# Patient Record
Sex: MALE | Employment: FULL TIME | URBAN - METROPOLITAN AREA
[De-identification: names, ages, dates, MRNs, and addresses within clinical notes are randomized per-mention and may not be internally consistent; named-entity substitution may affect disease eponyms.]

---

## 2017-01-20 ENCOUNTER — HOSPITAL ENCOUNTER (OUTPATIENT)
Dept: PHYSICAL THERAPY | Age: 32
Discharge: HOME | End: 2017-01-20
Payer: COMMERCIAL

## 2017-01-20 PROCEDURE — 97530 THERAPEUTIC ACTIVITIES: CPT | Performed by: PHYSICAL THERAPIST

## 2017-01-20 PROCEDURE — 97016 VASOPNEUMATIC DEVICE THERAPY: CPT | Performed by: PHYSICAL THERAPIST

## 2017-01-20 PROCEDURE — 97110 THERAPEUTIC EXERCISES: CPT | Performed by: PHYSICAL THERAPIST

## 2017-01-20 NOTE — PROGRESS NOTES
PT DAILY TREATMENT NOTE 2-15    Patient Name: Mickie Farah  Date:2017  : 1985  [x]  Patient  Verified  Payor: BLUE CROSS / Plan: [address] [place] [address] / Product Type: PPO /    In time: 1010a  Out time:   Total Treatment Time (min): 75  Visit #: 14     Treatment Area: Knee pain [M25.569]    SUBJECTIVE  Pain Level (0-10 scale): 0/10  Any medication changes, allergies to medications, adverse drug reactions, diagnosis change, or new procedure performed?: [x] No    [] Yes (see summary sheet for update)  Subjective functional status/changes:   [] No changes reported  Pt reports his knee is doing pretty well today.      OBJECTIVE    Modality rationale: decrease inflammation and decrease pain to improve the patients ability to restore activity tolerance   Min Type Additional Details    [] Estim: []Att   []Unatt        []TENS instruct                  []IFC  []Premod   []NMES                     []Other:  []w/US   []w/ice   []w/heat  Position:  Location:    []  Traction: [] Cervical       []Lumbar                       [] Prone          []Supine                       []Intermittent   []Continuous Lbs:  [] before manual  [] after manual  []w/heat    []  Ultrasound: []Continuous   [] Pulsed at:                            []1MHz   []3MHz Location:  W/cm2:    []  Paraffin         Location:  []w/heat    []  Ice     []  Heat  []  Ice massage Position:  Location:    []  Laser  []  Other: Position:  Location:   15 [x]  Vasopneumatic Device Pressure:       [] lo [x] med [] hi   Temperature: 34deg    [x] Skin assessment post-treatment:  [x]intact []redness- no adverse reaction    []redness  adverse reaction:     45 min Therapeutic Exercise:  [x] See flow sheet :   Rationale: increase strength, improve coordination and improve balance to improve the patients ability to perform jiu jitsu without pain    15 min Therapeutic Activity:  [x]  See flow sheet : single limb hop test training   Rationale: increase strength, improve coordination and increase proprioception  to improve the patients ability to jump, cut without pain    With   [] TE   [] TA   [] neuro   [] other: Patient Education: [x] Review HEP    [] Progressed/Changed HEP based on:   [] positioning   [] body mechanics   [] transfers   [] heat/ice application    [] other:      Other Objective/Functional Measures: Single limb hop testing:       Single: R: 5'5\" L: 7'2\" (76%)       Triple: R: 17'2\" L: 18'  (95%)      Triple Crossover: R: 16'8\" L: 18'  (93%)      6m Timed hop: R: 2.28\" L: 2.02\" (89%)    Pain Level (0-10 scale) post treatment: 0/10    ASSESSMENT/Changes in Function:   Nearly met single limb hop testing goals. Will plan on d/c at next session in 1-2 weeks pending ability to pass single limb hop test.   Patient will continue to benefit from skilled PT services to modify and progress therapeutic interventions, address functional mobility deficits, address ROM deficits, address strength deficits, analyze and address soft tissue restrictions and analyze and cue movement patterns to attain remaining goals. []  See Plan of Care  []  See progress note/recertification  []  See Discharge Summary         Progress towards goals / Updated goals:  Short Term Goals: To be accomplished in 2 weeks:  Pt will be able to perform straight leg raise without extensor lag to work to remove brace MET  Improved knee flexion ROM to 90deg without increase in symptoms MET  Maintain knee extension ROM of at least 0deg extension MET      Long Term Goals:  To be accomplished in 12 weeks:  Pt will be able to perform 10 single limb squats without increase in symptoms demonstrating good functional form MET  Pt will begin to return to light running without increase in symptoms MET  Pt will demonstrate at least 85% functional LE strength with single limb hop testing compared bilaterally PROGRESSING  Pt will be able to begin returning to kickboxing and jiu jitsu training without increase in symptoms    PLAN  [x]  Upgrade activities as tolerated     [x]  Continue plan of care  []  Update interventions per flow sheet       []  Discharge due to:_  []  Other:_      Darrin Nageotte, PT 1/20/2017  10:50 AM

## 2017-02-03 ENCOUNTER — HOSPITAL ENCOUNTER (OUTPATIENT)
Dept: PHYSICAL THERAPY | Age: 32
Discharge: HOME | End: 2017-02-03
Payer: COMMERCIAL

## 2017-02-03 PROCEDURE — 97110 THERAPEUTIC EXERCISES: CPT | Performed by: PHYSICAL THERAPIST

## 2017-02-03 PROCEDURE — 97530 THERAPEUTIC ACTIVITIES: CPT | Performed by: PHYSICAL THERAPIST

## 2017-02-03 NOTE — PROGRESS NOTES
PT DAILY TREATMENT NOTE 2-15    Patient Name: Carleen Avendano  Date:2/3/2017  : 1985  [x]  Patient  Verified  Payor: BLUE CROSS / Plan: [address] [place] [address] / Product Type: PPO /    In time: 56a  Out time: 940a  Total Treatment Time (min): 50  Visit #: 15     Treatment Area: Knee pain [M25.569]    SUBJECTIVE  Pain Level (0-10 scale): 0/10  Any medication changes, allergies to medications, adverse drug reactions, diagnosis change, or new procedure performed?: [x] No    [] Yes (see summary sheet for update)  Subjective functional status/changes:   [] No changes reported  Pt reports he is doing well today. OBJECTIVE    20 min Therapeutic Exercise:  [x] See flow sheet :   Rationale: increase strength and improve coordination to improve the patients ability to restore mobility and function    30 min Therapeutic Activity:  [x]  See flow sheet : single limb hop testing   Rationale: increase strength and improve coordination  to improve the patients ability to restore mobility and function          With   [x] TE   [] TA   [] neuro   [] other: Patient Education: [x] Review HEP    [] Progressed/Changed HEP based on:   [] positioning   [] body mechanics   [] transfers   [] heat/ice application    [] other:      Other Objective/Functional Measures:  FOTO: 84/100    Pain Level (0-10 scale) post treatment: 0/10    ASSESSMENT/Changes in Function:   []  See Plan of Care  []  See progress note/recertification  [x]  See Discharge Summary         Progress towards goals / Updated goals:  See d/c note    PLAN  []  Upgrade activities as tolerated     []  Continue plan of care  []  Update interventions per flow sheet       [x]  Discharge due to: ability to self-manage  []  Other:_      Alex Corrales, PT 2/3/2017  8:54 AM

## 2017-02-03 NOTE — ANCILLARY DISCHARGE INSTRUCTIONS
[facility]  [address] [address], [address]  Phone: [phone]  Fax: [phone]    Discharge Summary  2-15    Patient name: Laura Javier  : 1985  Provider#: [id number]  Referral source: Dhiraj Moser MD      Medical/Treatment Diagnosis: Knee pain [M25.569]     Prior Hospitalization: see medical history     Comorbidities: chronic knee pain, probably patellar dislocations on R  Prior Level of Function: able to kick box, participate in jiu jitsu without pain  Medications: Verified on Patient Summary List    Start of Care: 16      Onset Date: 16   Visits from Start of Care: 15     Missed Visits: 1  Reporting Period : 16 to 2/3/17    ASSESSMENT/SUMMARY OF CARE: Mr. Christal Salazar has been seen for 15 sessions s/p ACL repair. He has made good progress to this point and is now demonstrating at least 85% on functional single limb hop testing. He should be able to continue to manage care independently at this time. No further PT required. Single limb hop testing:  Single: R: 6'2\" L: 7'2\" (86%)  Triple: R: 17'11\" L: 20'3\" (88%)  Triple Crossover: R: 16'8\" L: 18'4\" (91%)  6m Timed hop: R: 2.28\" L: 2.14\" (94%)    Short Term Goals: To be accomplished in 2 weeks:  Pt will be able to perform straight leg raise without extensor lag to work to remove brace MET  Improved knee flexion ROM to 90deg without increase in symptoms MET  Maintain knee extension ROM of at least 0deg extension MET      Long Term Goals:  To be accomplished in 12 weeks:  Pt will be able to perform 10 single limb squats without increase in symptoms demonstrating good functional form MET  Pt will begin to return to light running without increase in symptoms MET  Pt will demonstrate at least 85% functional LE strength with single limb hop testing compared bilaterally MET  Pt will be able to begin returning to kickboxing and jiu jitsu training without increase in symptoms NOT YET ATTEMPTED WITHOUT BRACE    RECOMMENDATIONS:  [x]Discontinue therapy: [x]Patient has reached or is progressing toward set goals      []Patient is non-compliant or has abdicated      []Due to lack of appreciable progress towards set [address] Adrian Copeland, PT 2/3/2017 8:55 AM

## 2017-04-13 ENCOUNTER — OFFICE VISIT (OUTPATIENT)
Dept: INTERNAL MEDICINE CLINIC | Age: 32
End: 2017-04-13

## 2017-04-13 VITALS
WEIGHT: 206 LBS | RESPIRATION RATE: 16 BRPM | DIASTOLIC BLOOD PRESSURE: 88 MMHG | SYSTOLIC BLOOD PRESSURE: 132 MMHG | BODY MASS INDEX: 27.3 KG/M2 | HEART RATE: 78 BPM | TEMPERATURE: 98.2 F | HEIGHT: 73 IN

## 2017-04-13 DIAGNOSIS — A63.0 GENITAL WARTS: Primary | ICD-10-CM

## 2017-04-13 RX ORDER — IMIQUIMOD 12.5 MG/.25G
CREAM TOPICAL
Qty: 24 PACKET | Refills: 1 | Status: SHIPPED | OUTPATIENT
Start: 2017-04-13

## 2017-04-13 NOTE — PATIENT INSTRUCTIONS
Genital Warts: Care Instructions  Your Care Instructions  Genital warts are caused by a virus called the human papillomavirus (HPV). They are considered a sexually transmitted infection (STI) because the virus can be spread by sexual contact. The warts often look like small, fleshy bumps or flat, white patches. They can be anywhere in the genital area. You can also be infected with HPV yet not have visible warts. Genital warts often go away on their own without treatment. Some people decide to treat them because of the symptoms or the warts' appearance. Follow-up care is a key part of your treatment and safety. Be sure to make and go to all appointments, and call your doctor if you are having problems. It's also a good idea to know your test results and keep a list of the medicines you take. How can you care for yourself at home? · If your doctor gave you medicine to treat your warts at home, use the medicine exactly as prescribed. Call your doctor if you think you are having a problem with your medicine. · To reduce the itching and irritation from genital warts:  ¨ Take warm baths or wash the area with warm water 3 or 4 times a day. ¨ Keep the warts clean and dry in between baths. You may want to let the sores air dry. This may feel better than a towel. ¨ Do not use over-the-counter wart removal products to treat genital warts. These products are not intended for the genital area and may cause serious burns. To prevent the spread of genital warts  · Be sure to use a latex condom every time you have sex. You can infect someone even if you do not have an obvious wart. · Having one sex partner (who does not have STIs and does not have sex with anyone else) is a good way to avoid STIs. · Wash your hands if you touch the warts. · Talk to your sex partner or partners about genital warts. When should you call for help?   Call your doctor now or seek immediate medical care if:  · A genital wart hurts or spreads. Watch closely for changes in your health, and be sure to contact your doctor if:  · You want further treatment for your genital warts. · You do not get better as expected. Where can you learn more? Go to http://florentino-ubaldo.info/. Enter U930 in the search box to learn more about \"Genital Warts: Care Instructions. \"  Current as of: July 28, 2016  Content Version: 11.2  © 9286-5236 [employer]. Care instructions adapted under license by [employer] (which disclaims liability or warranty for this information). If you have questions about a medical condition or this instruction, always ask your healthcare professional. [place] [address] any warranty or liability for your use of this information.

## 2017-04-13 NOTE — PROGRESS NOTES
Subjective:    Theodore Kaminski is a 28 y.o. male who presents for lesion removal. We have discussed this procedure, including option of not performing surgery, technique of surgery and potential for scarring at an earlier visit. Oubjective:   Patient appears well. Visit Vitals    /88    Pulse 78    Temp 98.2 °F (36.8 °C)    Resp 16    Ht 6' 1\" (1.854 m)    Wt 206 lb (93.4 kg)    BMI 27.18 kg/m2     Skin: penis - multiple condyloma accuminata  Right hand - multiple warts on hand  Assessment:   warts    Procedure Note:   After informed consent was obtained, cryotherapy with liquid nitrogen was performed. Antibiotic dressing is applied, and wound care instructions provided. Be alert for any signs of cutaneous infection. The procedure was well tolerated without complications. Follow up: the patient may return prn. Additional lesion site: right hand wart   cryotherapy with liquid nitrogen was performed. Antibiotic dressing is applied, and wound care instructions provided. The procedure was well tolerated without complications. Follow up: prn.

## 2017-04-13 NOTE — PROGRESS NOTES
Chief Complaint   Patient presents with    Abrasion     penis     he is a 28y.o. year old male who presents for evaluation of an abrasion on his penis. Was told it was not HPV but it has been there for about 2 years    Reviewed and agree with Nurse Note and duplicated in this note. Reviewed PmHx, RxHx, FmHx, SocHx, AllgHx and updated and dated in the chart. Family History   Problem Relation Age of Onset    No Known Problems Mother     No Known Problems Father      No past medical history on file. Social History     Social History    Marital status: SINGLE     Spouse name: N/A    Number of children: N/A    Years of education: N/A     Social History Main Topics    Smoking status: Never Smoker    Smokeless tobacco: Not on file    Alcohol use Yes      Comment: socially    Drug use: No    Sexual activity: Not on file     Other Topics Concern    Not on file     Social History Narrative        Review of Systems - negative except as listed above      Objective:     Vitals:    04/13/17 1407   BP: 132/88   Pulse: 78   Resp: 16   Temp: 98.2 °F (36.8 °C)   Weight: 206 lb (93.4 kg)   Height: 6' 1\" (1.854 m)       Physical Examination: General appearance - alert, well appearing, and in no distress  Back exam - full range of motion, no tenderness, palpable spasm or pain on motion  Neurological - alert, oriented, normal speech, no focal findings or movement disorder noted  Musculoskeletal - no joint tenderness, deformity or swelling  Extremities - peripheral pulses normal, no pedal edema, no clubbing or cyanosis  Skin - penis multiple genital warts    Assessment/ Plan:   Dsutin Dubois was seen today for abrasion. Diagnoses and all orders for this visit:    Genital warts  -     [id number] - DESTRUC BENIGN LESION, UP TO 14 LESIONS    Other orders  -     imiquimod (ALDARA) 5 % cream; Apply a thin layer 3 times/week prior to bedtime and leave on skin for 6-10 hours. Remove with mild soap and water.  Continue treatment until there is total clearance of the warts or a maximum duration of therapy of 16 weeks     Follow-up Disposition: Not on File    I have discussed the diagnosis with the patient and the intended plan as seen in the above orders. The patient has received an after-visit summary and questions were answered concerning future plans. Medication Side Effects and Warnings were discussed with patient: yes  Patient Labs were reviewed and or requested: yes  Patient Past Records were reviewed and or requested  yes  I have discussed the diagnosis with the patient and the intended plan as seen in the above orders. The patient has received an after-visit summary and questions were answered concerning future plans. Pt agrees to call or return to clinic and/or go to closest ER with any worsening of symptoms. This may include, but not limited to increased fever (>100.4) with NSAIDS or Tylenol, increased edema, confusion, rash, worsening of presenting symptoms. Patient was informed/counseled to:      1) Remember to stay active and/or exercise regularly (I suggest 30-45 minutes daily)   2) For reliable dietary information, go to www. EATRIGHT.org. You may wish to consider seeing the nutritionist at [facility] at #[phone] or [phone], also consider the [address]   3) I routinely suggest a complete physical exam once each year (your birth month)

## 2017-05-09 ENCOUNTER — HOSPITAL ENCOUNTER (OUTPATIENT)
Dept: GENERAL RADIOLOGY | Age: 32
Discharge: HOME | End: 2017-05-09
Attending: ORTHOPAEDIC SURGERY
Payer: COMMERCIAL

## 2017-05-09 ENCOUNTER — HOSPITAL ENCOUNTER (OUTPATIENT)
Dept: MRI IMAGING | Age: 32
Discharge: HOME | End: 2017-05-09
Attending: ORTHOPAEDIC SURGERY
Payer: COMMERCIAL

## 2017-05-09 DIAGNOSIS — M25.511 RIGHT SHOULDER PAIN: ICD-10-CM

## 2017-05-09 PROCEDURE — A9585 GADOBUTROL INJECTION: HCPCS | Performed by: RADIOLOGY

## 2017-05-09 PROCEDURE — 73222 MRI JOINT UPR EXTREM W/DYE: CPT

## 2017-05-09 PROCEDURE — 74011250636 HC RX REV CODE- 250/636: Performed by: RADIOLOGY

## 2017-05-09 PROCEDURE — 74011000250 HC RX REV CODE- 250: Performed by: RADIOLOGY

## 2017-05-09 PROCEDURE — 23350 INJECTION FOR SHOULDER X-RAY: CPT

## 2017-05-09 PROCEDURE — 74011636320 HC RX REV CODE- 636/320: Performed by: RADIOLOGY

## 2017-05-09 RX ORDER — LIDOCAINE HYDROCHLORIDE 10 MG/ML
4 INJECTION INFILTRATION; PERINEURAL
Status: COMPLETED | OUTPATIENT
Start: 2017-05-09 | End: 2017-05-09

## 2017-05-09 RX ADMIN — GADOBUTROL 1 ML: 604.72 INJECTION INTRAVENOUS at 14:00

## 2017-05-09 RX ADMIN — LIDOCAINE HYDROCHLORIDE 4 ML: 10 INJECTION, SOLUTION INFILTRATION; PERINEURAL at 13:38

## 2017-05-09 RX ADMIN — IOHEXOL 5 ML: 300 INJECTION, SOLUTION INTRAVENOUS at 13:39

## 2017-05-25 ENCOUNTER — ANESTHESIA EVENT (OUTPATIENT)
Dept: SURGERY | Age: 32
End: 2017-05-25
Payer: COMMERCIAL

## 2017-05-25 NOTE — PERIOP NOTES
[facility]  PREOPERATIVE INSTRUCTIONS    Surgery Date:   5/26/2017  Surgery arrival time given by surgeon: NO   If no,[facility] [address] staff will call you between 4 PM- 8 PM the day before surgery with your arrival time. If your surgery is on a Monday, we will call you the preceding Friday. Please call [phone] after 8 PM if you did not receive your arrival time. 1. Please report at the designated time to the 2nd [address] [facility]. Bring your insurance card, photo identification, and any copayment ( if applicable). 2. You must have a responsible adult to drive you home. You need to have a responsible adult to stay with you the first 24 hours after surgery if you are going home the same day of your surgery and you should not drive a car for 24 hours following your surgery. 3. Nothing to eat or drink after midnight the night before surgery. This includes no water, gum, mints, coffee, juice, etc.  Please note special instructions, if applicable, below for medications. 4. MEDICATIONS TO TAKE THE MORNING OF SURGERY WITH A SIP OF WATER: none  5. You MAY take your pain medication the day of surgery with a sip of water. 6. No alcoholic beverages 24 hours before or after your surgery. 7. If you are being admitted to the hospital,please leave personal belongings/luggage in your car until you have an assigned hospital room number. 8. Stop Aspirin and/or any non-steroidal anti-inflammatory drugs (i.e. Ibuprofen, Naproxen, Advil, Aleve) as directed by your prescribing physician. You may take Tylenol. Stop herbal supplements 1 week prior to  surgery. 9. If you are currently taking Plavix, Coumadin,or any other blood-thinning/anticoagulant medication contact your prescribing physician for instructions. 10. Please wear comfortable clothes. Wear your glasses instead of contacts. We ask that all money, jewelry and valuables be left at home. Wear no make up, particularly mascara, the day of surgery.    11.  All body piercings, rings,and jewelry need to be removed and left at home. Please wear your hair loose or down. Please no pony-tails, buns, or any metal hair accessories. If you shower the morning of surgery, please do not apply any lotions, powders, or deodorants afterwards. Do not shave any body area within 24 hours of your surgery. 12. Please follow all instructions to avoid any potential surgical cancellation. 13. Should your physical condition change, (i.e. fever, cold, flu, etc.) please notify your surgeon as soon as possible. 14. It is important to be on time. If a situation occurs where you may be delayed, please call:  [phone] / [phone] on the day of surgery. 15. The Preadmission Testing staff can be reached at [phone]. [facility]) 16. Special instructions:   1. Please bring your completed medication sheet with you the day of surgery. Please update any changes in medications. 2. Free  parking  The patient was contacted via phone. He  verbalize  understanding of all instructions does not  need reinforcement.

## 2017-05-26 ENCOUNTER — HOSPITAL ENCOUNTER (OUTPATIENT)
Age: 32
Setting detail: OUTPATIENT SURGERY
Discharge: HOME | End: 2017-05-26
Attending: ORTHOPAEDIC SURGERY | Admitting: ORTHOPAEDIC SURGERY
Payer: COMMERCIAL

## 2017-05-26 ENCOUNTER — ANESTHESIA (OUTPATIENT)
Dept: SURGERY | Age: 32
End: 2017-05-26
Payer: COMMERCIAL

## 2017-05-26 VITALS
RESPIRATION RATE: 15 BRPM | WEIGHT: 200 LBS | OXYGEN SATURATION: 93 % | SYSTOLIC BLOOD PRESSURE: 117 MMHG | DIASTOLIC BLOOD PRESSURE: 65 MMHG | HEIGHT: 72 IN | TEMPERATURE: 97.2 F | HEART RATE: 59 BPM | BODY MASS INDEX: 27.09 KG/M2

## 2017-05-26 PROCEDURE — 76060000064 HC AMB SURG ANES 2 TO 2.5 HR: Performed by: ORTHOPAEDIC SURGERY

## 2017-05-26 PROCEDURE — 74011250636 HC RX REV CODE- 250/636

## 2017-05-26 PROCEDURE — 74011250636 HC RX REV CODE- 250/636: Performed by: ANESTHESIOLOGY

## 2017-05-26 PROCEDURE — 74011000250 HC RX REV CODE- 250

## 2017-05-26 PROCEDURE — 77030002919 HC SUT FBRTAPE ARTH -B: Performed by: ORTHOPAEDIC SURGERY

## 2017-05-26 PROCEDURE — 77030018835 HC SOL IRR LR ICUM -A: Performed by: ORTHOPAEDIC SURGERY

## 2017-05-26 PROCEDURE — 77030010816: Performed by: ORTHOPAEDIC SURGERY

## 2017-05-26 PROCEDURE — 77030011930 HC WND ARTHRO ABLT S&N -C: Performed by: ORTHOPAEDIC SURGERY

## 2017-05-26 PROCEDURE — 64415 NJX AA&/STRD BRCH PLXS IMG: CPT

## 2017-05-26 PROCEDURE — 77030002922 HC SUT FBRWRE ARTH -B: Performed by: ORTHOPAEDIC SURGERY

## 2017-05-26 PROCEDURE — 76210000057 HC AMBSU PH II REC 1 TO 1.5 HR: Performed by: ORTHOPAEDIC SURGERY

## 2017-05-26 PROCEDURE — 77030032497 HC WRP SHLDR WO BGS SOLM -B

## 2017-05-26 PROCEDURE — 77030006884 HC BLD SHV INCIS S&N -B: Performed by: ORTHOPAEDIC SURGERY

## 2017-05-26 PROCEDURE — A4565 SLINGS: HCPCS

## 2017-05-26 PROCEDURE — 77030002916 HC SUT ETHLN J&J -A: Performed by: ORTHOPAEDIC SURGERY

## 2017-05-26 PROCEDURE — 74011250636 HC RX REV CODE- 250/636: Performed by: ORTHOPAEDIC SURGERY

## 2017-05-26 PROCEDURE — 76210000040 HC AMBSU PH I REC FIRST 0.5 HR: Performed by: ORTHOPAEDIC SURGERY

## 2017-05-26 PROCEDURE — 77030010428: Performed by: ORTHOPAEDIC SURGERY

## 2017-05-26 PROCEDURE — C1713 ANCHOR/SCREW BN/BN,TIS/BN: HCPCS | Performed by: ORTHOPAEDIC SURGERY

## 2017-05-26 PROCEDURE — 77030003598 HC NDL MULT/FIRE ARTH -C: Performed by: ORTHOPAEDIC SURGERY

## 2017-05-26 PROCEDURE — 76030000004 HC AMB SURG OR TIME 2 TO 2.5: Performed by: ORTHOPAEDIC SURGERY

## 2017-05-26 PROCEDURE — 77030020782 HC GWN BAIR PAWS FLX 3M -B

## 2017-05-26 PROCEDURE — 77030020143 HC AIRWY LARYN INTUB CGAS -A: Performed by: ANESTHESIOLOGY

## 2017-05-26 PROCEDURE — 77030008496 HC TBNG ARTHSC IRR S&N -B: Performed by: ORTHOPAEDIC SURGERY

## 2017-05-26 PROCEDURE — 77030016678 HC BUR SHV4 S&N -B: Performed by: ORTHOPAEDIC SURGERY

## 2017-05-26 PROCEDURE — 77030003601 HC NDL NRV BLK BBMI -A

## 2017-05-26 PROCEDURE — 77030002966 HC SUT PDS J&J -A: Performed by: ORTHOPAEDIC SURGERY

## 2017-05-26 DEVICE — ANCHOR SUT L19.1MM DIA4.75MM BIOCOMPOSITE FULL THRD: Type: IMPLANTABLE DEVICE | Site: SHOULDER | Status: FUNCTIONAL

## 2017-05-26 DEVICE — ANCHOR SUTURE 4.75X19.1 MM TIG TAPE LOOP WHT BLK PUSHLOCK: Type: IMPLANTABLE DEVICE | Site: SHOULDER | Status: FUNCTIONAL

## 2017-05-26 DEVICE — ANCHOR SUTURE BIOCOMP 4.75X19.1 MM SWIVELOCK C: Type: IMPLANTABLE DEVICE | Site: SHOULDER | Status: FUNCTIONAL

## 2017-05-26 RX ORDER — ROPIVACAINE HYDROCHLORIDE 5 MG/ML
INJECTION, SOLUTION EPIDURAL; INFILTRATION; PERINEURAL AS NEEDED
Status: DISCONTINUED | OUTPATIENT
Start: 2017-05-26 | End: 2017-05-26 | Stop reason: HOSPADM

## 2017-05-26 RX ORDER — FENTANYL CITRATE 50 UG/ML
INJECTION, SOLUTION INTRAMUSCULAR; INTRAVENOUS AS NEEDED
Status: DISCONTINUED | OUTPATIENT
Start: 2017-05-26 | End: 2017-05-26 | Stop reason: HOSPADM

## 2017-05-26 RX ORDER — LIDOCAINE HYDROCHLORIDE 20 MG/ML
INJECTION, SOLUTION EPIDURAL; INFILTRATION; INTRACAUDAL; PERINEURAL AS NEEDED
Status: DISCONTINUED | OUTPATIENT
Start: 2017-05-26 | End: 2017-05-26 | Stop reason: HOSPADM

## 2017-05-26 RX ORDER — SODIUM CHLORIDE, SODIUM LACTATE, POTASSIUM CHLORIDE, CALCIUM CHLORIDE 600; 310; 30; 20 MG/100ML; MG/100ML; MG/100ML; MG/100ML
125 INJECTION, SOLUTION INTRAVENOUS CONTINUOUS
Status: DISCONTINUED | OUTPATIENT
Start: 2017-05-26 | End: 2017-05-26 | Stop reason: HOSPADM

## 2017-05-26 RX ORDER — DEXAMETHASONE SODIUM PHOSPHATE 4 MG/ML
INJECTION, SOLUTION INTRA-ARTICULAR; INTRALESIONAL; INTRAMUSCULAR; INTRAVENOUS; SOFT TISSUE AS NEEDED
Status: DISCONTINUED | OUTPATIENT
Start: 2017-05-26 | End: 2017-05-26 | Stop reason: HOSPADM

## 2017-05-26 RX ORDER — NALOXONE HYDROCHLORIDE 0.4 MG/ML
0.2 INJECTION, SOLUTION INTRAMUSCULAR; INTRAVENOUS; SUBCUTANEOUS
Status: DISCONTINUED | OUTPATIENT
Start: 2017-05-26 | End: 2017-05-26 | Stop reason: HOSPADM

## 2017-05-26 RX ORDER — HYDROMORPHONE HYDROCHLORIDE 1 MG/ML
.25-1 INJECTION, SOLUTION INTRAMUSCULAR; INTRAVENOUS; SUBCUTANEOUS
Status: DISCONTINUED | OUTPATIENT
Start: 2017-05-26 | End: 2017-05-26 | Stop reason: HOSPADM

## 2017-05-26 RX ORDER — MIDAZOLAM HYDROCHLORIDE 1 MG/ML
INJECTION, SOLUTION INTRAMUSCULAR; INTRAVENOUS AS NEEDED
Status: DISCONTINUED | OUTPATIENT
Start: 2017-05-26 | End: 2017-05-26 | Stop reason: HOSPADM

## 2017-05-26 RX ORDER — PROPOFOL 10 MG/ML
INJECTION, EMULSION INTRAVENOUS AS NEEDED
Status: DISCONTINUED | OUTPATIENT
Start: 2017-05-26 | End: 2017-05-26 | Stop reason: HOSPADM

## 2017-05-26 RX ORDER — CEFAZOLIN SODIUM IN 0.9 % NACL 2 G/50 ML
2 INTRAVENOUS SOLUTION, PIGGYBACK (ML) INTRAVENOUS ONCE
Status: COMPLETED | OUTPATIENT
Start: 2017-05-26 | End: 2017-05-26

## 2017-05-26 RX ORDER — LIDOCAINE HYDROCHLORIDE 10 MG/ML
0.1 INJECTION, SOLUTION EPIDURAL; INFILTRATION; INTRACAUDAL; PERINEURAL AS NEEDED
Status: DISCONTINUED | OUTPATIENT
Start: 2017-05-26 | End: 2017-05-26 | Stop reason: HOSPADM

## 2017-05-26 RX ORDER — DIPHENHYDRAMINE HYDROCHLORIDE 50 MG/ML
12.5 INJECTION, SOLUTION INTRAMUSCULAR; INTRAVENOUS AS NEEDED
Status: DISCONTINUED | OUTPATIENT
Start: 2017-05-26 | End: 2017-05-26 | Stop reason: HOSPADM

## 2017-05-26 RX ORDER — GLYCOPYRROLATE 0.2 MG/ML
INJECTION INTRAMUSCULAR; INTRAVENOUS AS NEEDED
Status: DISCONTINUED | OUTPATIENT
Start: 2017-05-26 | End: 2017-05-26 | Stop reason: HOSPADM

## 2017-05-26 RX ORDER — FLUMAZENIL 0.1 MG/ML
0.2 INJECTION INTRAVENOUS
Status: DISCONTINUED | OUTPATIENT
Start: 2017-05-26 | End: 2017-05-26 | Stop reason: HOSPADM

## 2017-05-26 RX ORDER — MIDAZOLAM HYDROCHLORIDE 1 MG/ML
2 INJECTION, SOLUTION INTRAMUSCULAR; INTRAVENOUS
Status: DISCONTINUED | OUTPATIENT
Start: 2017-05-26 | End: 2017-05-26 | Stop reason: HOSPADM

## 2017-05-26 RX ORDER — ONDANSETRON 2 MG/ML
INJECTION INTRAMUSCULAR; INTRAVENOUS AS NEEDED
Status: DISCONTINUED | OUTPATIENT
Start: 2017-05-26 | End: 2017-05-26 | Stop reason: HOSPADM

## 2017-05-26 RX ADMIN — FENTANYL CITRATE 50 MCG: 50 INJECTION, SOLUTION INTRAMUSCULAR; INTRAVENOUS at 07:52

## 2017-05-26 RX ADMIN — GLYCOPYRROLATE 0.2 MG: 0.2 INJECTION INTRAMUSCULAR; INTRAVENOUS at 08:06

## 2017-05-26 RX ADMIN — SODIUM CHLORIDE, SODIUM LACTATE, POTASSIUM CHLORIDE, AND CALCIUM CHLORIDE 1000 ML: 600; 310; 30; 20 INJECTION, SOLUTION INTRAVENOUS at 06:24

## 2017-05-26 RX ADMIN — LIDOCAINE HYDROCHLORIDE 40 MG: 20 INJECTION, SOLUTION EPIDURAL; INFILTRATION; INTRACAUDAL; PERINEURAL at 07:36

## 2017-05-26 RX ADMIN — DEXAMETHASONE SODIUM PHOSPHATE 8 MG: 4 INJECTION, SOLUTION INTRA-ARTICULAR; INTRALESIONAL; INTRAMUSCULAR; INTRAVENOUS; SOFT TISSUE at 07:56

## 2017-05-26 RX ADMIN — ONDANSETRON 4 MG: 2 INJECTION INTRAMUSCULAR; INTRAVENOUS at 09:32

## 2017-05-26 RX ADMIN — ROPIVACAINE HYDROCHLORIDE 30 ML: 5 INJECTION, SOLUTION EPIDURAL; INFILTRATION; PERINEURAL at 06:53

## 2017-05-26 RX ADMIN — MIDAZOLAM HYDROCHLORIDE 1 MG: 1 INJECTION, SOLUTION INTRAMUSCULAR; INTRAVENOUS at 06:48

## 2017-05-26 RX ADMIN — FENTANYL CITRATE 50 MCG: 50 INJECTION, SOLUTION INTRAMUSCULAR; INTRAVENOUS at 06:45

## 2017-05-26 RX ADMIN — CEFAZOLIN 2 G: 1 INJECTION, POWDER, FOR SOLUTION INTRAMUSCULAR; INTRAVENOUS; PARENTERAL at 07:50

## 2017-05-26 RX ADMIN — MIDAZOLAM HYDROCHLORIDE 2 MG: 1 INJECTION, SOLUTION INTRAMUSCULAR; INTRAVENOUS at 06:45

## 2017-05-26 RX ADMIN — MIDAZOLAM HYDROCHLORIDE 1 MG: 1 INJECTION, SOLUTION INTRAMUSCULAR; INTRAVENOUS at 06:46

## 2017-05-26 RX ADMIN — SODIUM CHLORIDE, POTASSIUM CHLORIDE, SODIUM LACTATE AND CALCIUM CHLORIDE: 600; 310; 30; 20 INJECTION, SOLUTION INTRAVENOUS at 07:21

## 2017-05-26 RX ADMIN — PROPOFOL 200 MG: 10 INJECTION, EMULSION INTRAVENOUS at 07:36

## 2017-05-26 RX ADMIN — FENTANYL CITRATE 50 MCG: 50 INJECTION, SOLUTION INTRAMUSCULAR; INTRAVENOUS at 07:29

## 2017-05-26 RX ADMIN — SODIUM CHLORIDE, POTASSIUM CHLORIDE, SODIUM LACTATE AND CALCIUM CHLORIDE: 600; 310; 30; 20 INJECTION, SOLUTION INTRAVENOUS at 08:57

## 2017-05-26 RX ADMIN — MIDAZOLAM HYDROCHLORIDE 1 MG: 1 INJECTION, SOLUTION INTRAMUSCULAR; INTRAVENOUS at 07:29

## 2017-05-26 RX ADMIN — FENTANYL CITRATE 50 MCG: 50 INJECTION, SOLUTION INTRAMUSCULAR; INTRAVENOUS at 09:15

## 2017-05-26 NOTE — IP AVS SNAPSHOT
Javier Armando 
 
 
 [address] [facility] 
[phone] Patient: Teddy Glez MRN: [id number] :1985 You are allergic to the following No active allergies Recent Documentation Height Weight BMI Smoking Status 1.829 m 90.7 kg 27.12 kg/m2 Never Smoker Emergency Contacts Name Discharge Info Relation Home Work Mobile [facility] CAREGIVER [3] Parent [1] [phone] Avila Vargas DISCHARGE CAREGIVER [3] Parent [1]   [phone] About your hospitalization You were admitted on:  May 26, 2017 You last received care in the:  [facility] You were discharged on:  May 26, 2017 Unit phone number:  [phone] Why you were hospitalized Your primary diagnosis was:  Not on File Providers Seen During Your Hospitalizations Provider Role Specialty Primary office phone Mina Galeano MD Attending Provider Orthopedic Surgery [phone] Your Primary Care Physician (PCP) Primary Care Physician Office Phone Office Fax [facility] [phone] Follow-up Information Follow up With Details Comments Contact Info Natanael Uribe MD   [address] [address] 
[phone] Current Discharge Medication List  
  
CONTINUE these medications which have NOT CHANGED Dose & Instructions Dispensing Information Comments Morning Noon Evening Bedtime FISH OIL 1,000 mg Cap Generic drug:  omega-3 fatty acids-vitamin e Your last dose was: Your next dose is:    
   
   
 Dose:  1 Cap Take 1 Cap by mouth. Refills:  0  
     
   
   
   
  
 imiquimod 5 % cream  
Commonly known as:  Ying Gallego Your last dose was:     
   
Your next dose is:    
   
   
 Apply a thin layer 3 times/week prior to bedtime and leave on skin for 6-10 hours. Remove with mild soap and water. Continue treatment until there is total clearance of the warts or a maximum duration of therapy of 16 weeks Quantity:  24 Packet Refills:  1  
     
   
   
   
  
 multivitamin tablet Commonly known as:  ONE A DAY Your last dose was: Your next dose is:    
   
   
 Dose:  1 Tab Take 1 Tab by mouth daily. Refills:  0 Discharge Instructions DISCHARGE SUMMARY from your Nurse PATIENT INSTRUCTIONS After general anesthesia or intravenous sedation, for 24 hours or while taking prescription Narcotics: · Limit your activities · Do not drive and operate hazardous machinery · Do not make important personal or business decisions · Do  not drink alcoholic beverages · If you have not urinated within 8 hours after discharge, please contact your surgeon on call. Report the following to your surgeon: 
· Excessive pain, swelling, redness or odor of or around the surgical area · Temperature over 100.5 · Nausea and vomiting lasting longer than 4 hours or if unable to take medications · Any signs of decreased circulation or nerve impairment to extremity: change in color, persistent  numbness, tingling, coldness or increase pain · Any questions [address] Breathing deeply and coughing are very important exercises to do after surgery. Deep breathing and coughing open the little air tubes and air sacks in your lungs. You take deep breaths every day. You may not even notice - it is just something you do when you sigh or yawn. It is a natural exercise you do to keep these air passages open. After surgery, take deep breaths and cough, on purpose. DIRECTIONS: 
· Take 10 to 15 slow deep breaths every hour while awake. · Breathe in deeply, and hold it for 2 seconds. · Exhale slowly through puckered lips, like blowing up a balloon. · After every 4th or 5th deep breath, hug your pillow to your chest or belly and give a hard, deep cough. Yes, it will probably hurt. But doing this exercise is a very important part of healing after surgery. Take your pain medicine to help you do this exercise without too much pain. Coughing and deep breathing help prevent bronchitis and pneumonia after surgery. If you had chest or belly surgery, use a pillow as a \"hug vasiliy\" and hold it tightly to your chest or belly when you cough. ANKLE PUMPS Ankle pumps increase the circulation of oxygenated blood to your lower extremities and decrease your risk for circulation problems such as blood clots. They also stretch the muscles, tendons and ligaments in your foot and ankle, and prevent joint contracture in the ankle and foot, especially after surgeries on the legs. It is important to do ankle pump exercises regularly after surgery because immobility increases your risk for developing a blood clot. Your doctor may also have you take an Aspirin for the next few days as well. If your doctor did not ask you to take an Aspirin, consult with him before starting Aspirin therapy on your own. The exercise is quite simple. · Slowly point your foot forward, feeling the muscles on the top of your lower leg stretch, and hold this position for 5 seconds. · Next, pull your foot back toward you as far as possible, stretching the calf muscles, and hold that position for 5 seconds. · Repeat with the other foot. · Perform 10 repetitions every hour while awake for both ankles if possible (down and then up with the foot once is one repetition). You should feel gentle stretching of the muscles in your lower leg when doing this exercise. If you feel pain, or your range of motion is limited, don't push too hard.   Only go the limit your joint and muscles will let you go. If you have increasing pain, progressively worsening leg warmth or swelling, STOP the exercise and call your doctor. MEDICATION AND  
SIDE EFFECT GUIDE The [address] [facility] EFFECT GUIDE was provided to the PATIENT AND CARE PROVIDER. Information provided includes instruction about drug purpose and common side effects for the following medications:  
· *** These are general instructions for a healthy lifestyle: *   Please give a list of your current medications to your Primary Care Provider. *   Please update this list whenever your medications are discontinued, doses are changed, or new medications (including over-the-counter products) are added. *   Please carry medication information at all times in case of emergency situations. About Smoking No smoking / No tobacco products Avoid exposure to second hand smoke Surgeon General's Warning:  Quitting smoking now greatly reduces serious risk to your health. Obesity, smoking, and sedentary lifestyle greatly increases your risk for illness and disease. A healthy diet, regular physical exercise & weight monitoring are important for maintaining a healthy lifestyle. Congestive Heart Failure You may be retaining fluid if you have a history of heart failure or if you experience any of the following symptoms:  Weight gain of 3 pounds or more overnight or 5 pounds in a week, increased swelling in your hands or feet or shortness of breath while lying flat in bed. Please call your doctor as soon as you notice any of these symptoms; do not wait until your next office visit. Recognize signs and symptoms of STROKE: 
F -  Face looks uneven A -  Arms unable to move or move evenly S -  Speech slurred or non-existent T -  Time-call 911 as soon as signs and symptoms begin-DO NOT go  
       back to bed or wait to see if you get better-TIME IS BRAIN.  
 
 
Warning Signs of HEART ATTACK  
 Call 911 if you have these symptoms: 
 
? Chest discomfort. Most heart attacks involve discomfort in the center of the chest that lasts more than a few minutes, or that goes away and comes back. It can feel like uncomfortable pressure, squeezing, fullness, or pain. ? Discomfort in other areas of the upper body. Symptoms can include pain or discomfort in one or both arms, the back, neck, jaw, or stomach. ? Shortness of breath with or without chest discomfort. ? Other signs may include breaking out in a cold sweat, nausea, or lightheadedness. Don't wait more than five minutes to call [address]! Fast action can save your life. Calling 911 is almost always the fastest way to get lifesaving treatment. Emergency Medical Services staff can begin treatment when they arrive  up to an hour sooner than if someone gets to the hospital by car. The discharge information has been reviewed with the patient and caregiver. Any questions and concerns from the patient and caregiver have been addressed. The patient and caregiver verbalized understanding. Other information in your discharge envelope: PRESCRIPTIONS PHYSICAL THERAPY PRESCRIPTION 
     APPOINTMENT CARDS [facility]est[facility] Pamphlet for block or block with On-Q Catheter from   Anesthesia Service Medical device information sheets/pamphlets from their  School/work excuse note. /parent work excuse note. The following personal items collected during your admission are returned to you:  
Dental Appliance: Dental Appliances: None Vision: Visual Aid: None Hearing Aid:   
Jewelry: Jewelry: None Clothing: Clothing: Footwear, Pants, Shirt, Undergarments Other Valuables: Other Valuables: None Valuables sent to safe:          
Going Home After A Peripheral Nerve Block Patient Information The anesthesiology team has provided for your pain control through a technique called regional anesthesia. As the name implies, anesthesia (decreased or no pain, sensation, or motor control) has been provided to a specific region, whether that be an arm or a leg. How does this happen?  you might ask. While you were sleepy, the anesthesia provider provided medicine to a specific group of nerves either in the neck/shoulder region or in the groin and/or buttock region, similar to the way a dentist might numb a tooth (teeth.)  They typically use an ultrasound machine to know where the medicine is placed in relation to the nerves they wish to numb up or block.   What this means is that for the next few hours, you should expect to have a numb limb. Below are some things we wish for you to read and be familiar with concerning your anesthetized limb. Caring For a Blocked Body Part General Considerations: ? The numbness may last up to 24 hours ? You must protect your arm or leg. The blocked extremity is numb, weak, and difficult for your brain to locate and communicate with. To do this you should: 
o Pay attention to the position of the blocked limb at all times. o Be very careful when placing hot or cod items on a numb limb. You could cause tissue damage like burns or frostbite if you are unable to feel temperature. Carefully follow your discharge instructions regarding the use of these therapies in you post-operative care. o Carefully pad the affected limb. Normally we continually move and adjust the position of our bodies without thinking about it. This movement and continuous repositioning helps to prevent injuries from immobility. When a limb is numb it still requires this care 
o Be extremely careful not to bump or hit the numb body part. This can result in an unrecognized injury, at lease until the blocked limb wakes up. It can also result in worse pain of your already post-surgical limb. Arm/Shoulder Blocks: ? You may experience a droopy eyelid, nasal stuffiness, and redness of the eye after receiving an arm/shoulder block. This is called Gustavos Syndrome, and is very common. There is no need for concern. You may also experience mild hoarseness, but all of these symptoms should resolve within 24 hours. ? Some patients may experience mild shortness of breath. A sitting position will help alleviate this and it should resolve within 24 hours. If you experience significant or progressive worsening of the shortness of breath, seek medical attention immediately. Knee/Foot Blocks: 
? DO NOT use the blocked leg to walk, balance or support yourself. Your leg will not be able to balance your weight properly while any part of your leg is numb. You are at a RISK for [address]. ? Be careful not to drag your foot along the ground or stub your toes. Contact Phone Numbers CALL 911 IN CASE OF AN EMERGENCY. For all other non-emergency inquiries call the [facility]  at [phone] and ask for the anesthesiologist on call to be paged. [address] EFFECT GUIDE The [address] EFFECT GUIDE was provided to the PATIENT AND CARE PROVIDER. Information provided includes instruction about drug purpose and common side effects for the following medications:  
 [address] Discharge Orders None Introducing [facility] SERVICES! [employer] introduces [employer] patient portal. Now you can access parts of your medical record, email your doctor's office, and request medication refills online. 1. In your internet browser, go to [email or web address] 2. Click on the First Time User? Click Here link in the Sign In box. You will see the New Member Sign Up page. 3. Enter your [employer] Access Code exactly as it appears below. You will not need to use this code after youve completed the sign-up process.  If you do not sign up before the expiration date, you must request a new code. · Wi-Chi Access Code: [email or web address] Expires: 7/12/2017  2:40 PM 
 
4. Enter the last four digits of your Social Security Number (xxxx) and Date of Birth (mm/dd/yyyy) as indicated and click Submit. You will be taken to the next sign-up page. 5. Create a Wi-Chi ID. This will be your [employer] login ID and cannot be changed, so think of one that is secure and easy to remember. 6. Create a [employer] password. You can change your password at any time. 7. Enter your Password Reset Question and Answer. This can be used at a later time if you forget your password. 8. Enter your e-mail address. You will receive e-mail notification when new information is available in [address] 9. Click Sign Up. You can now view and download portions of your medical record. 10. Click the Download Summary menu link to download a portable copy of your medical information. If you have questions, please visit the Frequently Asked Questions section of the [employer] website. Remember, [employer] is NOT to be used for urgent needs. For medical emergencies, dial 911. Now available from your iPhone and Android! General Information Please provide this summary of care documentation to your next provider. Patient Signature:  ____________________________________________________________ Date:  ____________________________________________________________  
  
Saida Truong Provider Signature:  ____________________________________________________________ Date:  ____________________________________________________________

## 2017-05-26 NOTE — BRIEF OP NOTE
BRIEF OPERATIVE NOTE    Date of Procedure: 5/26/2017   Preoperative Diagnosis: RIGHT SHOULDER ROTATOR CUFF TEAR, CHRONIC LABRUM DEGENERATION, COMPLETE RUPTURE AND RETRACTION LONG HEAD OF BICEPS TENDON  Postoperative Diagnosis: RIGHT SHOULDER ROTATOR CUFF TEAR, CHRONIC LABRUM DEGENERATION, COMPLETE RUPTURE AND RETRACTION LONG HEAD OF BICEPS TENDON  Procedure(s):  RIGHT SHOULDER ARTHROSCOPY with rotator cuff repair and debridement, labral  debridement, subacromial decompression and DISTAL CLAVICLE RESECTION   Surgeon(s) and Role:     * Rickey Espinoza MD - Primary         Assistant Staff:  Rashel Moon PA-C       Surgical Staff:  Circ-1: Maria Elena Palacio RN  Scrub Tech-1: Paulino Vickey  Surg Asst-1: Suella Safe  Event Time In   Incision Start 0802   Incision Close      Anesthesia: General   Estimated Blood Loss: none  Specimens: * No specimens in log *   Findings: RIGHT SHOULDER ROTATOR CUFF TEAR  Complications: none  Implants:   Implant Name Type Inv.  Item Serial No.  Lot No. LRB No. Used Action   ANCHOR BIOCOMP 4.75X19.1MM -- [employer] C-VENT - SNA  ANCHOR BIOCOMP 4.75X19.1MM -- [employer] C-VENT NA ARTH[employer] [id number] Right 1 Implanted   ANCHOR BIOCOMP 4.75X19.1MM -- [employer] C-VENT - SNA  ANCHOR BIOCOMP 4.75X19.1MM -- [employer] C-VENT NA [employer] [id number] Right 1 Implanted   SWIVELOCK BIOCOMP WHT BLK LOOP --  - SNA  SWIVELOCK BIOCOMP WHT BLK LOOP --  NA [employer]X [id number] Right 1 Implanted   ANCHOR BIOC SL FIBERTAPE 4.75 --  - SNA   ANCHOR BIOC SL FIBERTAPE 4.75 --  NA AR[employer]REX [id number] Right 1 Implanted

## 2017-05-26 NOTE — IP AVS SNAPSHOT
Current Discharge Medication List  
  
CONTINUE these medications which have NOT CHANGED Dose & Instructions Dispensing Information Comments Morning Noon Evening Bedtime FISH OIL 1,000 mg Cap Generic drug:  omega-3 fatty acids-vitamin e Your last dose was: Your next dose is:    
   
   
 Dose:  1 Cap Take 1 Cap by mouth. Refills:  0  
     
   
   
   
  
 imiquimod 5 % cream  
Commonly known as:  Yoan Padilla Your last dose was: Your next dose is:    
   
   
 Apply a thin layer 3 times/week prior to bedtime and leave on skin for 6-10 hours. Remove with mild soap and water. Continue treatment until there is total clearance of the warts or a maximum duration of therapy of 16 weeks Quantity:  24 Packet Refills:  1  
     
   
   
   
  
 multivitamin tablet Commonly known as:  ONE A DAY Your last dose was: Your next dose is:    
   
   
 Dose:  1 Tab Take 1 Tab by mouth daily. Refills:  0

## 2017-05-26 NOTE — DISCHARGE INSTRUCTIONS
DISCHARGE SUMMARY from your Nurse      PATIENT INSTRUCTIONS    After general anesthesia or intravenous sedation, for 24 hours or while taking prescription Narcotics:  · Limit your activities  · Do not drive and operate hazardous machinery  · Do not make important personal or business decisions  · Do  not drink alcoholic beverages  · If you have not urinated within 8 hours after discharge, please contact your surgeon on call. Report the following to your surgeon:  · Excessive pain, swelling, redness or odor of or around the surgical area  · Temperature over 100.5  · Nausea and vomiting lasting longer than 4 hours or if unable to take medications  · Any signs of decreased circulation or nerve impairment to extremity: change in color, persistent  numbness, tingling, coldness or increase pain  · Any questions      COUGH AND DEEP BREATHE    Breathing deeply and coughing are very important exercises to do after surgery. Deep breathing and coughing open the little air tubes and air sacks in your lungs. You take deep breaths every day. You may not even notice - it is just something you do when you sigh or yawn. It is a natural exercise you do to keep these air passages open. After surgery, take deep breaths and cough, on purpose. DIRECTIONS:  · Take 10 to 15 slow deep breaths every hour while awake. · Breathe in deeply, and hold it for 2 seconds. · Exhale slowly through puckered lips, like blowing up a balloon. · After every 4th or 5th deep breath, hug your pillow to your chest or belly and give a hard, deep cough. Yes, it will probably hurt. But doing this exercise is a very important part of healing after surgery. Take your pain medicine to help you do this exercise without too much pain. Coughing and deep breathing help prevent bronchitis and pneumonia after surgery.   If you had chest or belly surgery, use a pillow as a \"hug buddy\" and hold it tightly to your chest or belly when you cough.       ANKLE PUMPS    Ankle pumps increase the circulation of oxygenated blood to your lower extremities and decrease your risk for circulation problems such as blood clots. They also stretch the muscles, tendons and ligaments in your foot and ankle, and prevent joint contracture in the ankle and foot, especially after surgeries on the legs. It is important to do ankle pump exercises regularly after surgery because immobility increases your risk for developing a blood clot. Your doctor may also have you take an Aspirin for the next few days as well. If your doctor did not ask you to take an Aspirin, consult with him before starting Aspirin therapy on your own. The exercise is quite simple. · Slowly point your foot forward, feeling the muscles on the top of your lower leg stretch, and hold this position for 5 seconds. · Next, pull your foot back toward you as far as possible, stretching the calf muscles, and hold that position for 5 seconds. · Repeat with the other foot. · Perform 10 repetitions every hour while awake for both ankles if possible (down and then up with the foot once is one repetition). You should feel gentle stretching of the muscles in your lower leg when doing this exercise. If you feel pain, or your range of motion is limited, don't push too hard. Only go the limit your joint and muscles will let you go. If you have increasing pain, progressively worsening leg warmth or swelling, STOP the exercise and call your doctor. MEDICATION AND   SIDE EFFECT GUIDE    The [employer] MEDICATION AND SIDE EFFECT GUIDE was provided to the PATIENT AND CARE PROVIDER. Information provided includes instruction about drug purpose and common side effects for the following medications:   ·         These are general instructions for a healthy lifestyle:    *   Please give a list of your current medications to your Primary Care Provider.   *   Please update this list whenever your medications are discontinued, doses are changed, or new medications (including over-the-counter products) are added. *   Please carry medication information at all times in case of emergency situations. About Smoking  No smoking / No tobacco products  Avoid exposure to second hand smoke     Surgeon General's Warning:  Quitting smoking now greatly reduces serious risk to your health. Obesity, smoking, and sedentary lifestyle greatly increases your risk for illness and disease. A healthy diet, regular physical exercise & weight monitoring are important for maintaining a healthy lifestyle. Congestive Heart Failure  You may be retaining fluid if you have a history of heart failure or if you experience any of the following symptoms:  Weight gain of 3 pounds or more overnight or 5 pounds in a week, increased swelling in your hands or feet or shortness of breath while lying flat in bed. Please call your doctor as soon as you notice any of these symptoms; do not wait until your next office visit. Recognize signs and symptoms of STROKE:  F -  Face looks uneven  A -  Arms unable to move or move evenly  S -  Speech slurred or non-existent  T -  Time-call 911 as soon as signs and symptoms begin-DO NOT go          back to bed or wait to see if you get better-TIME IS BRAIN. Warning Signs of HEART ATTACK   Call 911 if you have these symptoms:     Chest discomfort. Most heart attacks involve discomfort in the center of the chest that lasts more than a few minutes, or that goes away and comes back. It can feel like uncomfortable pressure, squeezing, fullness, or pain.  Discomfort in other areas of the upper body. Symptoms can include pain or discomfort in one or both arms, the back, neck, jaw, or stomach.  Shortness of breath with or without chest discomfort.  Other signs may include breaking out in a cold sweat, nausea, or lightheadedness.     Don't wait more than five minutes to call 911 - MINUTES MATTER! Fast action can save your life. Calling 911 is almost always the fastest way to get lifesaving treatment. Emergency Medical Services staff can begin treatment when they arrive -- up to an hour sooner than if someone gets to the hospital by car. The discharge information has been reviewed with the patient and caregiver. Any questions and concerns from the patient and caregiver have been addressed. The patient and caregiver verbalized understanding. Other information in your discharge envelope:  []     PRESCRIPTIONS  []     PHYSICAL THERAPY PRESCRIPTION  []     APPOINTMENT CARDS  []     Regional Anesthesia Pamphlet for block or block with On-Q Catheter from   Anesthesia Service  []     Medical device information sheets/pamphlets from their    []     School/work excuse note. []     /parent work excuse note. The following personal items collected during your admission are returned to you:   Dental Appliance: Dental Appliances: None  Vision: Visual Aid: None  Hearing Aid:    Jewelry: Jewelry: None  Clothing: Clothing: Footwear, Pants, Shirt, Undergarments  Other Valuables: Other Valuables: None  Valuables sent to safe:           Going Home After A  Peripheral Nerve Block    Patient Information    The anesthesiology team has provided for your pain control through a technique called regional anesthesia. As the name implies, anesthesia (decreased or no pain, sensation, or motor control) has been provided to a specific region, whether that be an arm or a leg. How does this happen?  you might ask.     While you were sleepy, the anesthesia provider provided medicine to a specific group of nerves either in the neck/shoulder region or in the groin and/or buttock region, similar to the way a dentist might numb a tooth (teeth.)  They typically use an ultrasound machine to know where the medicine is placed in relation to the nerves they wish to numb up or block.   What this means is that for the next few hours, you should expect to have a numb limb. Below are some things we wish for you to read and be familiar with concerning your anesthetized limb. Caring For a Blocked Body Part    General Considerations:   The numbness may last up to 24 hours   You must protect your arm or leg. The blocked extremity is numb, weak, and difficult for your brain to locate and communicate with. To do this you should:  o Pay attention to the position of the blocked limb at all times. o Be very careful when placing hot or cod items on a numb limb. You could cause tissue damage like burns or frostbite if you are unable to feel temperature. Carefully follow your discharge instructions regarding the use of these therapies in you post-operative care. o Carefully pad the affected limb. Normally we continually move and adjust the position of our bodies without thinking about it. This movement and continuous repositioning helps to prevent injuries from immobility. When a limb is numb it still requires this care  o Be extremely careful not to bump or hit the numb body part. This can result in an unrecognized injury, at lease until the blocked limb wakes up. It can also result in worse pain of your already post-surgical limb. Arm/Shoulder Blocks:   You may experience a droopy eyelid, nasal stuffiness, and redness of the eye after receiving an arm/shoulder block. This is called Gustavos Syndrome, and is very common. There is no need for concern. You may also experience mild hoarseness, but all of these symptoms should resolve within 24 hours.  Some patients may experience mild shortness of breath. A sitting position will help alleviate this and it should resolve within 24 hours. If you experience significant or progressive worsening of the shortness of breath, seek medical attention immediately.     Knee/Foot Blocks:   DO NOT use the blocked leg to walk, balance or support yourself. Your leg will not be able to balance your weight properly while any part of your leg is numb. You are at a RISK for [address].  Be careful not to drag your foot along the ground or stub your toes. Contact Phone Numbers    CALL 911 IN CASE OF AN EMERGENCY. For all other non-emergency inquiries call the [employer]  at [phone] and ask for the anesthesiologist on call to be paged. Francisco Varela MEDICATION AND   SIDE EFFECT GUIDE    The [employer] MEDICATION AND SIDE EFFECT GUIDE was provided to the PATIENT AND CARE PROVIDER.   Information provided includes instruction about drug purpose and common side effects for the following medications:    [address]

## 2017-05-26 NOTE — ANESTHESIA POSTPROCEDURE EVALUATION
Post-Anesthesia Evaluation and Assessment    Patient: Antonia Dobbins MRN: [id number]  SSN: [id number]    YOB: 1985  Age: 28 y.o. Sex: male       Cardiovascular Function/Vital Signs  Visit Vitals    /58    Pulse (!) 59    Temp 36.2 °C (97.2 °F)    Resp 15    Ht 6' (1.829 m)    Wt 90.7 kg (200 lb)    SpO2 92%    BMI 27.12 kg/m2       Patient is status post general, regional anesthesia for Procedure(s):  RIGHT SHOULDER ARTHROSCOPY with rotator cuff repair and debridment, labral repair and debridment, and DISTAL CLAVICLE RESECTION . Nausea/Vomiting: Mild    Postoperative hydration reviewed and adequate. Pain:  Pain Scale 1: Numeric (0 - 10) (05/26/17 1047)  Pain Intensity 1: 2 (05/26/17 1047)   Managed    Neurological Status:   Neuro (WDL): Exceptions to WDL (05/26/17 1047)  Neuro  LUE Motor Response: Purposeful (05/26/17 1047)  LLE Motor Response: Purposeful (05/26/17 1047)  RUE Motor Response: Floppy (05/26/17 1047)  RLE Motor Response: Purposeful (05/26/17 1047)   Block intact    Mental Status and Level of Consciousness: Arousable    Pulmonary Status:   O2 Device: Room air (05/26/17 1047)   Adequate oxygenation and airway patent    Complications related to anesthesia: None    Post-anesthesia assessment completed.  No concerns    Signed By: Tania Mendenhall DO     May 26, 2017

## 2017-05-26 NOTE — ANESTHESIA PREPROCEDURE EVALUATION
Anesthetic History     PONV          Review of Systems / Medical History  Patient summary reviewed and nursing notes reviewed    Pulmonary  Within defined limits                 Neuro/Psych   Within defined limits           Cardiovascular  Within defined limits                Exercise tolerance: >4 METS     GI/Hepatic/Renal  Within defined limits              Endo/Other  Within defined limits           Other Findings   Comments: Recurrent dislocation           Physical Exam    Airway  Mallampati: III    Neck ROM: normal range of motion   Mouth opening: Normal     Cardiovascular    Rhythm: regular  Rate: normal         Dental         Pulmonary  Breath sounds clear to auscultation               Abdominal         Other Findings            Anesthetic Plan    ASA: 1  Anesthesia type: general      Post-op pain plan if not by surgeon: peripheral nerve block single    Induction: Intravenous  Anesthetic plan and risks discussed with: Patient      Informed consent obtained.

## 2017-05-26 NOTE — ANESTHESIA PROCEDURE NOTES
Peripheral Block    Start time: 5/26/2017 6:45 AM  End time: 5/26/2017 6:53 AM  Performed by: Casie Francis  Authorized by: Casie Francis       Pre-procedure: Indications: at surgeon's request and post-op pain management    Preanesthetic Checklist: patient identified, risks and benefits discussed, site marked, timeout performed, anesthesia consent given and patient being monitored    Timeout Time: 06:45          Block Type:   Block Type:   Interscalene  Laterality:  Right  Monitoring:  Continuous pulse ox, frequent vital sign checks, heart rate, responsive to questions and oxygen  Injection Technique:  Single shot  Procedures: ultrasound guided    Patient Position: supine  Prep: chlorhexidine    Location:  Interscalene  Needle Type:  Stimuplex  Needle Gauge:  22 G  Needle Localization:  Nerve stimulator and ultrasound guidance  Medication Injected:  0.5%  ropivacaine  Volume (mL):  30    Assessment:  Number of attempts:  1  Injection Assessment:  Incremental injection every 5 mL, local visualized surrounding nerve on ultrasound, negative aspiration for blood, no paresthesia and no intravascular symptoms  Patient tolerance:  Patient tolerated the procedure well with no immediate complications

## 2017-05-31 ENCOUNTER — HOSPITAL ENCOUNTER (OUTPATIENT)
Dept: PHYSICAL THERAPY | Age: 32
Discharge: HOME | End: 2017-05-31
Payer: COMMERCIAL

## 2017-05-31 PROCEDURE — 97110 THERAPEUTIC EXERCISES: CPT

## 2017-05-31 PROCEDURE — 97161 PT EVAL LOW COMPLEX 20 MIN: CPT

## 2017-05-31 NOTE — PROGRESS NOTES
[facility] Physical Therapy  [address] [address]  [place]  Phone: [phone]  Fax: [phone]    Plan of Care/Statement of Necessity for Physical Therapy Services  2-15    Patient name: Dianne Bradshaw  : 1985  Provider#: [id number]  Referral source: Suzy Araujo MD      Medical/Treatment Diagnosis: Pain in left shoulder [M25.512]     Prior Hospitalization: see medical history     Comorbidities: none noted  Prior Level of Function: completed 20 minutes of exercise 1-2x/week; works full-time in sales (desk job)  Medications: Verified on Patient Summary List    Start of Care: 2017    Onset Date: 2017       The Plan of Care and following information is based on the information from the initial evaluation. Assessment/ key information: Pt is a 28year old male who is referred to Physical Therapy by Dr. Xin Rizzo s/p right shoulder surgery 2017- rotator cuff repair and debridement, labral debridement, subacromial decompression and distal clavicle resection. Pt is compliant with abductor sling; surgical incisions are healing nicely. Pt is guarded of right UE; pt educated to relax arm into sling and avoid upper trapezius compensation. Right shoulder PROM: Flexion= 80, Abduction= 40, IR= 5, ER= 10 degrees. Patient will benefit from skilled PT services to modify and progress therapeutic interventions, address functional mobility deficits, address ROM deficits, address strength deficits, analyze and address soft tissue restrictions, analyze and cue movement patterns, analyze and modify body mechanics/ergonomics and assess and modify postural abnormalities to attain pt/PT goals.     Evaluation Complexity History MEDIUM  Complexity : 1-2 comorbidities / personal factors will impact the outcome/ POC ; Examination HIGH Complexity : 4+ Standardized tests and measures addressing body structure, function, activity limitation and / or participation in recreation ;Presentation LOW Complexity : Stable, uncomplicated  ;Clinical Decision Making MEDIUM Complexity : FOTO score of 26-74  Overall Complexity Rating: LOW     Problem List: pain affecting function, decrease ROM, decrease strength, edema affecting function, decrease ADL/ functional abilitiies, decrease activity tolerance, decrease flexibility/ joint mobility and decrease transfer abilities   Treatment Plan may include any combination of the following: Therapeutic exercise, Therapeutic activities, Neuromuscular re-education, Physical agent/modality, Manual therapy and Patient education  Patient / Family readiness to learn indicated by: asking questions, trying to perform skills and interest  Persons(s) to be included in education: patient (P)  Barriers to Learning/Limitations: None  Patient Goal (s): Back to fighting.   Patient Self Reported Health Status: excellent  Rehabilitation Potential: good    Short Term Goals: To be accomplished in 6 weeks:  1) Pt will be independent with HEP. 2) Pt will be able to perform grooming ADL's without pain. 3) Pt will be able to put on a shirt without pain. 4) Pt will be able to lie in bed to fall asleep without increase of pain. Long Term Goals: To be accomplished in 12 weeks:  1) Pt will be able to reach above shoulder level without increase of pain. 2) Pt will be able to sleep through the night without waking in pain. 3) Pt will be able to tuck in shirt without pain. 4) Pt will be educated on gradual return to lifting weights to further restore right shoulder strength and power. 5) Pt will report improvement in overall functional mobility, as measured by FOTO, with an increased score of at least 37 points, from 31 to 68. Frequency / Duration: Patient to be seen 2 times per week for 12 weeks.     Patient/ Caregiver education and instruction: self care, activity modification and exercises    [x]  Plan of care has been reviewed with NALINI Soriano, PT, DPT 5/31/2017 1:09 PM    ________________________________________________________________________    I certify that the above Therapy Services are being furnished while the patient is under my care. I agree with the treatment plan and certify that this therapy is necessary.     [de-identified] Signature:____________________  Date:____________Time: _________

## 2017-05-31 NOTE — PROGRESS NOTES
PT INITIAL EVALUATION NOTE - [facility] 2-15    Patient Name: Shane Todd  Date:2017  : 1985  [x]  Patient  Verified  Payor: Melchor Mari / Plan: [address] [place] [address] / Product Type: PPO /    In time:1:30pm  Out time:2:30pm  Total Treatment Time (min): 60  Total Timed Codes (min): 10  1:1 Treatment Time ( only): --   Visit #: 1     Treatment Area: Pain in left shoulder [M25.512]    SUBJECTIVE  Pain Level (0-10 scale): 2/10  Any medication changes, allergies to medications, adverse drug reactions, diagnosis change, or new procedure performed?: [] No    [x] Yes (see summary sheet for update)  Subjective:  Right shoulder surgery 2017- rotator cuff repair and debridement, labral debridement, subacromial decompression and distal clavicle resection. Pt received PRP injection by Dr. Xi Moreira yesterday. Pt has had right shoulder pain > 10 years, with multiple subluxations. Pt is very active- [facility] and lifting weight and was able to do all activities, with pain. Pt finally decided to pursue surgical intervention when he started having pain and subluxations at night when trying to sleep. PLOF: chronic right shoulder pain but very active- involved in jiu jitso, [place] fighting and weight lifting  Mechanism of Injury: snowboarding, fall on right shoulder > 10 years ago  Previous Treatment/Compliance: rest, exercise, pain medication, icing  PMHx/Surgical Hx: R ACL reconstruction; recurrent right shoulder subluxations  Work Hx: pt works full time in sales for [employer] Situation: lives with roommate in 2 story home  Pt Goals: \"Back to fighting. \"  Barriers: chronic pain/injury prior to surgery  Motivation: high  Substance use: none  FABQ Score: moderate  Cognition: A & O x 3        OBJECTIVE/EXAMINATION  Posture:  Pt donning abductor sling; rounded shoulders, right>left; forward head  Functional  and Pinch:  good  Palpation: surgical incisions healing nicely, tender to palpation; right shoulder warm to the touch    Right Shoulder ROM:  AROM  PROM   Flexion   NT  80   Abduction  NT  40   IR   NT  5   ER   NT  10    Joint Mobility Assessment: Glenohumeral: muscle guarding       Flexibility: tightness right UT, pecs    UPPER QUARTER   MUSCLE STRENGTH  KEY       R  L  0 - No Contraction   Flexion  NT  --  1 - Trace    Extension NT  --  2 - Poor    Abduction NT  --  3 - Fair     IR  NT  --  4 - Good    ER  NT  --  5 - Normal       Neurological: Reflexes / Sensations: normal  Special Tests: NT due to post-op    10 min Therapeutic Exercise:  [x] See flow sheet :   Rationale: increase ROM and increase strength and postural awareness to improve the patients ability to use right UE for functional and recreational activities          With   [] TE   [] TA   [] neuro   [] other: Patient Education: [x] Review HEP- pt given handout with exercises for HEP    [] Progressed/Changed HEP based on:   [] positioning   [] body mechanics   [] transfers   [] heat/ice application    [] other:      Other Objective/Functional Measures: FOTO: 31    Pain Level (0-10 scale) post treatment: 2/10    ASSESSMENT/Changes in Function:   Pt is a 28year old male who is referred to Physical Therapy by Dr. Nellie Mckeon s/p right shoulder surgery 05/26/2017- rotator cuff repair and debridement, labral debridement, subacromial decompression and distal clavicle resection. Pt is compliant with abductor sling; surgical incisions are healing nicely. Pt is guarded of right UE; pt educated to relax arm into sling and avoid upper trapezius compensation. Right shoulder PROM: Flexion= 80, Abduction= 40, IR= 5, ER= 10 degrees.   Patient will benefit from skilled PT services to modify and progress therapeutic interventions, address functional mobility deficits, address ROM deficits, address strength deficits, analyze and address soft tissue restrictions, analyze and cue movement patterns, analyze and modify body mechanics/ergonomics and assess and modify postural abnormalities to attain pt/PT goals.      [x]  See Plan of [address] SAM[address]. Ezequiel Eduardo., PT, DPT    5/31/2017  1:09 PM

## 2017-05-31 NOTE — OP NOTES
[facility]   [address] [facility], [address]   OP NOTE       Name:  Becki Roldan   MR#:  [id number]   :  1985   Account #:  [de-identified]    Surgery Date:  2017   Date of Adm:  2017       PREOPERATIVE DIAGNOSES:   1. Right shoulder rotator cuff tear. 2. Right shoulder labral tear. 3. Right shoulder impingement. 4. Right shoulder AC joint arthritis. POSTOPERATIVE DIAGNOSES:   1. Right shoulder rotator cuff tear. 2. Right shoulder labral tear. 3. Right shoulder impingement. 4. Right shoulder AC joint arthritis. PROCEDURES PERFORMED:   1. Right shoulder arthroscopic rotator cuff repair. 2. Right shoulder arthroscopic acromioplasty and subacromial   decompression. 3. Right shoulder arthroscopic distal clavicle resection. 4. Right shoulder arthroscopic debridement of labrum. SURGEON: Reena Schumacher. Oswald Middleton MD.    ASSISTANTALYSSA Armas.    ANESTHESIA: General plus regional block. COMPLICATIONS: None. ESTIMATED BLOOD LOSS: None. DRAINS: None. SPECIMENS REMOVED: none    DESCRIPTION: The patient was brought to the OR, given general   anesthesia, placed in a lateral decubitus position on a bean bag. All   bony prominences were protected. Head and neck was appropriately   aligned and secured to the table. Right shoulder was examined. There   was full range of motion without anterior or anterior inferior instability. No posterior instability. Prepped and draped in sterile fashion with   ChloraPrep, 10 pound suspension was utilized. The arthroscope   inserted into the joint through a posterior portal and examined   systematically. The biceps tendon was absent. There was fraying and   tearing of the supraspinatus tendon on the articular side. There was   absence of the anterior and anterior-inferior labrum that was partially   healed to the medial glenoid.  The capsular tissue was poor quality and   it was not felt that it could be mobilized for repair. This, combined with   the exam under anesthesia not showing any significant instability,   necessitated debridement alone of the labrum. The undersurface of the   rotator cuff was debrided and then tagged with an 0 PDS. The   arthroscope was redirected subacromially. A bursectomy was   performed. The area of the suture was probed. The probe fell into a   defect. It was only the far lateral portion of the bursal-sided cuff that   was intact. The medial portion was torn on the articular side. The   defect was opened with a shaver. The bone was debrided. It was   extremely sclerotic. It was roughened with a bur to a bleeding surface. There was a type 2 acromial hook with fraying of the coracoacromial   ligament that was taken down with the radiofrequency device. Acromioplasty was performed with the bur to convert it to a flat type 1   acromion. Next the supraspinatus a medium to large size tear was   repaired in a double-row fashion with 2 medial 4.75 mm SwiveLock   biocomposite anchors with FiberTape passed through the tear and   then bridged and secured laterally. A FiberLink was placed on the   anterior and posterior portions of the tear to prevent dog ears. Lateral   fixation with 4.75 mm SwiveLock anchors, resulting in a secure double-  row repair of the rotator cuff. Next, the [facility] joint was evaluated. There   was absence of articular cartilage with inferior spurring. A distal   clavicle resection was performed with a bur, creating a 10 mm space   between the clavicle and the acromion, while preserving the superior   and posterior ligaments. Subacromial space was irrigated. The portals   were closed with nylon. A sterile dressing was applied. The patient was   awakened and returned to recovery in stable condition. His family was   notified of his condition.         MD Sarah Daniels / Bradly   D:  05/31/2017   10:30   T:  05/31/2017   11:41   Job #:  [id number]

## 2017-06-05 ENCOUNTER — HOSPITAL ENCOUNTER (OUTPATIENT)
Dept: PHYSICAL THERAPY | Age: 32
Discharge: HOME | End: 2017-06-05
Payer: COMMERCIAL

## 2017-06-05 PROCEDURE — 97140 MANUAL THERAPY 1/> REGIONS: CPT

## 2017-06-05 PROCEDURE — 97110 THERAPEUTIC EXERCISES: CPT

## 2017-06-05 NOTE — PROGRESS NOTES
PT DAILY TREATMENT NOTE - [facility] 2-15    Patient Name: Sudheer Donato  Date:2017  : 1985  [x]  Patient  Verified  Payor: Trever Holland / Plan: [address] [place] [address] / Product Type: PPO /    In time:9:40am  Out time:10:30am  Total Treatment Time (min): 50  Total Timed Codes (min): 50  1:1 Treatment Time ( only): --   Visit #: 2     Treatment Area: Pain in left shoulder [M25.512]    SUBJECTIVE  Pain Level (0-10 scale): 2/10  Any medication changes, allergies to medications, adverse drug reactions, diagnosis change, or new procedure performed?: [x] No    [] Yes (see summary sheet for update)  Subjective functional status/changes:   [] No changes reported  Pt reports compliance with HEP. Pt admits to staying out of the sling while at home. OBJECTIVE  25 min Therapeutic Exercise:  [x] See flow sheet :   Rationale: increase ROM, increase strength and improve coordination to improve the patients ability to perform functional activities    25 min Manual Therapy: glenohumeral joint mobilization- gentle oscillations- PROM right shoulder flexion, ER; STM right UT and stretching   Rationale: decrease pain, increase ROM, increase tissue extensibility and increase postural awareness to improve the patients ability to use right UE for functional activities          With   [] TE   [] TA   [] neuro   [] other: Patient Education: [x] Review HEP    [] Progressed/Changed HEP based on:   [] positioning   [] body mechanics   [] transfers   [] heat/ice application    [] other:      Other Objective/Functional Measures: --     Pain Level (0-10 scale) post treatment: 1/10    ASSESSMENT/Changes in Function:   Pt educated about the importance of adherence to MD instructions to wear the sling at all times, except when showering and performing HEP.   Patient will continue to benefit from skilled PT services to modify and progress therapeutic interventions, address functional mobility deficits, address ROM deficits, address strength deficits, analyze and address soft tissue restrictions, analyze and cue movement patterns, analyze and modify body mechanics/ergonomics and assess and modify postural abnormalities to attain remaining goals. []  See Plan of Care  []  See progress note/recertification  []  See Discharge Summary         Progress towards goals / Updated goals:  Short Term Goals: To be accomplished in 6 weeks:  1) Pt will be independent with HEP. MET  2) Pt will be able to perform grooming ADL's without pain. 3) Pt will be able to put on a shirt without pain. 4) Pt will be able to lie in bed to fall asleep without increase of pain.     Long Term Goals: To be accomplished in 12 weeks:  1) Pt will be able to reach above shoulder level without increase of pain. 2) Pt will be able to sleep through the night without waking in pain. 3) Pt will be able to tuck in shirt without pain. 4) Pt will be educated on gradual return to lifting weights to further restore right shoulder strength and power.   5) Pt will report improvement in overall functional mobility, as measured by FOTO, with an increased score of at least 37 points, from 31 to 68.     PLAN  [x]  Upgrade activities as tolerated     [x]  Continue plan of care  []  Update interventions per flow sheet       []  Discharge due to:_  []  Other:_      Emi Resendiz PT, DPT    6/5/2017  10:57 AM

## 2017-06-12 ENCOUNTER — OFFICE VISIT (OUTPATIENT)
Dept: INTERNAL MEDICINE CLINIC | Age: 32
End: 2017-06-12

## 2017-06-12 ENCOUNTER — HOSPITAL ENCOUNTER (OUTPATIENT)
Dept: PHYSICAL THERAPY | Age: 32
Discharge: HOME | End: 2017-06-12
Payer: COMMERCIAL

## 2017-06-12 VITALS
HEART RATE: 83 BPM | RESPIRATION RATE: 14 BRPM | SYSTOLIC BLOOD PRESSURE: 122 MMHG | OXYGEN SATURATION: 100 % | DIASTOLIC BLOOD PRESSURE: 86 MMHG | TEMPERATURE: 99 F | BODY MASS INDEX: 27.09 KG/M2 | HEIGHT: 72 IN | WEIGHT: 200 LBS

## 2017-06-12 DIAGNOSIS — Z00.00 VISIT FOR WELL MAN HEALTH CHECK: Primary | ICD-10-CM

## 2017-06-12 DIAGNOSIS — Z20.2 POSSIBLE EXPOSURE TO STD: ICD-10-CM

## 2017-06-12 DIAGNOSIS — A63.0 GENITAL WARTS: ICD-10-CM

## 2017-06-12 PROCEDURE — 97110 THERAPEUTIC EXERCISES: CPT

## 2017-06-12 PROCEDURE — 97140 MANUAL THERAPY 1/> REGIONS: CPT

## 2017-06-12 NOTE — PROGRESS NOTES
Subjective:    Jose Garcia is a 28 y.o. male who presents for lesion removal. We have discussed this procedure, including option of not performing surgery, technique of surgery and potential for scarring at an earlier visit. Oubjective:   Patient appears well. Visit Vitals    /86    Pulse 83    Temp 99 °F (37.2 °C) (Oral)    Resp 14    Ht 6' (1.829 m)    Wt 200 lb (90.7 kg)    SpO2 100%    BMI 27.12 kg/m2     Skin: Genital wart x 1 - < .25 cm    Assessment:       Procedure Note:   After informed consent was obtained, using alcohol for cleansing cryotherapy with liquid nitrogen was performed. Antibiotic dressing is applied, and wound care instructions provided. Be alert for any signs of cutaneous infection. The procedure was well tolerated without complications. Follow up: prn.

## 2017-06-12 NOTE — PROGRESS NOTES
Chief Complaint   Patient presents with    Complete Physical     Patient not fasting     he is a 28y.o. year old male who presents for CPE. Patient is not fasting and would like to have his Testerone tested. Complete Physical Exam  Pre-Visit Questions:    1. Do you follow a low fat diet? yes  2. Are you up to date on your Tdap (<10 years)? Yes  3. Have you ever had a Pneumovax vaccine (>65)? No   PCV13 No   PPSV23 No  4. Have you had Zoster vaccine (>60)? No  5. Have you had the HPV - Gardasil (13- 26)? Not applicable  6. Do you follow an exercise program?  yes  7. Do you smoke?  no If > 65 and smoker, have you had a abdominal aortic aneurysm ultrasound screen? No  8. Do you consider yourself overweight?  no  9. Is there a family history of CAD< age 48? No  10. Is there a family history of Cancer? Yes  11. Do you know your Cancer risks? No  12. Have you had a colonoscopy? No  13. Have you been tested for HIV or other STI's? Yes HIV ASIUP(52-96 y/o)? Yes   14. Have you had an EKG in the last five years(>50)? Unknown    Other complaints:   Reviewed and agree with Nurse Note and duplicated in this note. Reviewed PmHx, RxHx, FmHx, SocHx, AllgHx and updated and dated in the chart.     Family History   Problem Relation Age of Onset    No Known Problems Mother     No Known Problems Father        Past Medical History:   Diagnosis Date    Nausea & vomiting     Shoulder pain       Social History     Social History    Marital status: SINGLE     Spouse name: N/A    Number of children: N/A    Years of education: N/A     Social History Main Topics    Smoking status: Never Smoker    Smokeless tobacco: Never Used    Alcohol use Yes      Comment: 2-3 drinks a month    Drug use: No    Sexual activity: Not on file     Other Topics Concern    Not on file     Social History Narrative        Review of Systems - negative except as listed above      Objective:     Vitals:    06/12/17 1404   BP: (!) 137/92 Pulse: 83   Resp: 14   Temp: 99 °F (37.2 °C)   TempSrc: Oral   SpO2: 100%   Weight: 200 lb (90.7 kg)   Height: 6' (1.829 m)       Physical Examination: General appearance - alert, well appearing, and in no distress  Eyes - pupils equal and reactive, extraocular eye movements intact  Ears - bilateral TM's and external ear canals normal  Nose - normal and patent, no erythema, discharge or polyps  Mouth - mucous membranes moist, pharynx normal without lesions  Neck - supple, no significant adenopathy  Chest - clear to auscultation, no wheezes, rales or rhonchi, symmetric air entry  Heart - normal rate, regular rhythm, normal S1, S2, no murmurs, rubs, clicks or gallops  Abdomen - soft, nontender, nondistended, no masses or organomegaly  Back exam - full range of motion, no tenderness, palpable spasm or pain on motion  Neurological - alert, oriented, normal speech, no focal findings or movement disorder noted  Musculoskeletal - no joint tenderness, deformity or swelling  Extremities - peripheral pulses normal, no pedal edema, no clubbing or cyanosis  Skin - normal coloration and turgor, no rashes, no suspicious skin lesions noted      Patient was informed/counseled on: Body mass index is 27.12 kg/(m^2). Assessment/ Plan:   Gwendolyn Rodriguez was seen today for complete physical.    Diagnoses and all orders for this visit:    Visit for [facility] health check  -     CBC W/O DIFF  -     METABOLIC PANEL, COMPREHENSIVE  -     LIPID PANEL  -     VITAMIN D, 25 HYDROXY  -     HIV 1/2 AG/AB, 4TH GENERATION,W RFLX CONFIRM    Possible exposure to STD    Genital warts  -     NON-TREPONEMAL SCREENING VDRL  -     CHLAMYDIA / GC AMPLIFICATION  -     [id number] - DESTRUC BENIGN LESION, UP TO 14 LESIONS     Follow-up Disposition: Not on File    Labs to be drawn: CBC, CMP, Lipid, Vit d - 25  I have discussed the diagnosis with the patient and the intended plan as seen in the above orders.   The patient has received an after-visit summary and questions were answered concerning future plans. Medication Side Effects and Warnings were discussed with patient: yes  Patient Labs were reviewed and or requested: yes  Patient Past Records were reviewed and or requested  yes  I have discussed the diagnosis with the patient and the intended plan as seen in the above orders. The patient has received an after-visit summary and questions were answered concerning future plans. Pt agrees to call or return to clinic and/or go to closest ER with any worsening of symptoms. This may include, but not limited to increased fever (>100.4) with NSAIDS or Tylenol, increased edema, confusion, rash, worsening of presenting symptoms. Patient was informed/counseled to:    1) Remember to stay active and/or exercise regularly (I suggest 30-45 minutes daily)   2) For reliable dietary information, go to www. EATRIGHT.org. You may wish to consider seeing the nutritionist at [facility] at #[phone], also consider the [address]   3) I routinely suggest a complete physical exam once each year (your birth month)

## 2017-06-12 NOTE — PROGRESS NOTES
PT DAILY TREATMENT NOTE - [facility] 2-15    Patient Name: Yvette Chatterjee  Date:2017  : 1985  [x]  Patient  Verified  Payor: [facility] / Plan: [address] [place] [address] / Product Type: PPO /    In time:9:35am  Out time:10:40am  Total Treatment Time (min): 65  Total Timed Codes (min): 65  1:1 Treatment Time ( only): --   Visit #: 3     Treatment Area: Pain in left shoulder [M25.512]    SUBJECTIVE  Pain Level (0-10 scale): 2/10  Any medication changes, allergies to medications, adverse drug reactions, diagnosis change, or new procedure performed?: [x] No    [] Yes (see summary sheet for update)  Subjective functional status/changes:   [] No changes reported  Pt enters clinic without abductor sling. Pt states that he only wears the sling at night. OBJECTIVE  45 min Therapeutic Exercise:  [x] See flow sheet :   Rationale: increase ROM, increase strength and improve coordination to improve the patients ability to perform functional activities    20 min Manual Therapy: glenohumeral joint mobilization- gentle oscillations- PROM right shoulder flexion, ER; STM right UT and stretching   Rationale: decrease pain, increase ROM, increase tissue extensibility and increase postural awareness to improve the patients ability to use right UE for functional activities          With   [] TE   [] TA   [] neuro   [] other: Patient Education: [x] Review HEP    [] Progressed/Changed HEP based on:   [] positioning   [] body mechanics   [] transfers   [] heat/ice application    [] other:      Other Objective/Functional Measures: --     Pain Level (0-10 scale) post treatment: 1/10    ASSESSMENT/Changes in Function:   Reinforced importance of adherence to MD instructions to wear the sling at all times, except when showering and performing HEP; pt verbalized understanding. Pt demonstrates improved tolerance of right shoulder PROM stretching. Pt given updated handout with exercises for HEP.    Patient will continue to benefit from skilled PT services to modify and progress therapeutic interventions, address functional mobility deficits, address ROM deficits, address strength deficits, analyze and address soft tissue restrictions, analyze and cue movement patterns, analyze and modify body mechanics/ergonomics and assess and modify postural abnormalities to attain remaining goals. []  See Plan of Care  []  See progress note/recertification  []  See Discharge Summary         Progress towards goals / Updated goals:  Short Term Goals: To be accomplished in 6 weeks:  1) Pt will be independent with HEP. MET  2) Pt will be able to perform grooming ADL's without pain. 3) Pt will be able to put on a shirt without pain. 4) Pt will be able to lie in bed to fall asleep without increase of pain.     Long Term Goals: To be accomplished in 12 weeks:  1) Pt will be able to reach above shoulder level without increase of pain. 2) Pt will be able to sleep through the night without waking in pain. 3) Pt will be able to tuck in shirt without pain. 4) Pt will be educated on gradual return to lifting weights to further restore right shoulder strength and power.   5) Pt will report improvement in overall functional mobility, as measured by FOTO, with an increased score of at least 37 points, from 31 to 68.     PLAN  [x]  Upgrade activities as tolerated     [x]  Continue plan of care  []  Update interventions per flow sheet       []  Discharge due to:_  []  Other:_      Eufemia Davis PT, DPT    6/12/2017  10:57 AM

## 2017-06-19 ENCOUNTER — HOSPITAL ENCOUNTER (OUTPATIENT)
Dept: PHYSICAL THERAPY | Age: 32
Discharge: HOME | End: 2017-06-19
Payer: COMMERCIAL

## 2017-06-19 PROCEDURE — 97140 MANUAL THERAPY 1/> REGIONS: CPT

## 2017-06-19 PROCEDURE — 97110 THERAPEUTIC EXERCISES: CPT

## 2017-06-19 NOTE — PROGRESS NOTES
PT DAILY TREATMENT NOTE - [facility] 2-15    Patient Name: Teddy Glez  Date:2017  : 1985  [x]  Patient  Verified  Payor: Chaya Loredo / Plan: [address] [place] [address] / Product Type: PPO /    In time:9:40am  Out time:10:30am  Total Treatment Time (min):50  Total Timed Codes (min): 50  1:1 Treatment Time ( only): --   Visit #: 4     Treatment Area: Pain in left shoulder [M25.512]    SUBJECTIVE  Pain Level (0-10 scale): 1/10  Any medication changes, allergies to medications, adverse drug reactions, diagnosis change, or new procedure performed?: [x] No    [] Yes (see summary sheet for update)  Subjective functional status/changes:   [] No changes reported  Pt admits several days of non-compliance with HEP. Pt continues to be non-compliant with wearing abductor sling. OBJECTIVE  40 min Therapeutic Exercise:  [x] See flow sheet :   Rationale: increase ROM, increase strength and improve coordination to improve the patients ability to perform functional activities    10 min Manual Therapy: glenohumeral joint mobilization- gentle oscillations- PROM right shoulder flexion, ER; STM right UT and stretching   Rationale: decrease pain, increase ROM, increase tissue extensibility and increase postural awareness to improve the patients ability to use right UE for functional activities          With   [] TE   [] TA   [] neuro   [] other: Patient Education: [x] Review HEP    [] Progressed/Changed HEP based on:   [] positioning   [] body mechanics   [] transfers   [] heat/ice application    [] other:      Other Objective/Functional Measures: --     Pain Level (0-10 scale) post treatment: 1/10    ASSESSMENT/Changes in Function:   []  See Plan of Care  [x]  See progress note/recertification  []  See Discharge Summary         Progress towards goals / Updated goals:  Short Term Goals: To be accomplished in 6 weeks:  1) Pt will be independent with HEP. MET  2) Pt will be able to perform grooming ADL's without pain. progressing  3) Pt will be able to put on a shirt without pain. progressing  4) Pt will be able to lie in bed to fall asleep without increase of pain. MET      Long Term Goals: To be accomplished in 12 weeks:  1) Pt will be able to reach above shoulder level without increase of pain. progressing  2) Pt will be able to sleep through the night without waking in pain. progressing  3) Pt will be able to tuck in shirt without pain. progressing  4) Pt will be educated on gradual return to lifting weights to further restore right shoulder strength and power. progressing  5) Pt will report improvement in overall functional mobility, as measured by FOTO, with an increased score of at least 37 points, from 31 to 68.  NOT ASSESSED     PLAN  [x]  Upgrade activities as tolerated     [x]  Continue plan of care  []  Update interventions per flow sheet       []  Discharge due to:_  []  Other:_      Sabine Baker PT, DPT    6/19/2017  10:57 AM

## 2017-06-19 NOTE — PROGRESS NOTES
[facility]   [address] [address]  Phone: [phone] Fax: [phone]    Progress Note    Name: Shaun Mayorga   : 1985   MD: Graciela Patrick MD     Treatment Diagnosis: Pain in left shoulder [M25.512]  Start of Care: 2017    Visits from Start of Care: 4  Missed Visits: 0    Summary of Care: Pt has participated in 4 outpatient PT sessions, 2017-2017, s/p right rotator cuff repair and debridement, labral debridement, subacromial decompression and distal clavicle resection. Treatment has included therapeutic exercise, manual therapy, pt education, and home exercise program.  Pt has a ice pack that he uses several times/day to help manage pain. Assessment / Recommendations:  Pt admits non-compliance with wearing abductor sling during the day but does wear it to sleep at night. Pt demonstrates improved right shoulder PROM:    Initial  Current  Flexion 80  152  Abduction 40  95  ER  10  55  IR  5  40  Warm and sensitivity to touch around surgical incisions has resolved. Pt is progressing well with PROM and we have initiated submaximal isometrics for shoulder with elbow bent. Pt has been attending PT 1x/week. Recommend continued PT 1-2x/week x at least 4 weeks to improve pain-free right shoulder ROM and to progress strengthening and scapular stabilization exercises. Progress towards goals / Updated goals:  Short Term Goals: To be accomplished in 6 weeks:  1) Pt will be independent with HEP. MET  2) Pt will be able to perform grooming ADL's without pain. progressing  3) Pt will be able to put on a shirt without pain. progressing  4) Pt will be able to lie in bed to fall asleep without increase of pain. MET      Long Term Goals: To be accomplished in 12 weeks:  1) Pt will be able to reach above shoulder level without increase of pain. progressing  2) Pt will be able to sleep through the night without waking in pain. progressing  3) Pt will be able to tuck in shirt without pain. progressing  4) Pt will be educated on gradual return to lifting weights to further restore right shoulder strength and power. progressing  5) Pt will report improvement in overall functional mobility, as measured by FOTO, with an increased score of at least 37 points, from 31 to 68. NOT ASSESSED    Da Fermin, PT, DPT   6/19/2017 3:30 PM    ________________________________________________________________________  NOTE TO PHYSICIAN:  Please complete the following and fax to: [facility]: Fax: [phone] . Kam Mcnulty Retain this original for your records. If you are unable to process this request in 24 hours, please contact our office.        ____ I have read the above report and request that my patient continue therapy with the following changes/special instructions:  ____ I have read the above report and request that my patient be discharged from therapy    Physician's Signature:_________________ Date:___________Time:__________

## 2017-06-26 ENCOUNTER — HOSPITAL ENCOUNTER (OUTPATIENT)
Dept: PHYSICAL THERAPY | Age: 32
Discharge: HOME | End: 2017-06-26
Payer: COMMERCIAL

## 2017-06-26 PROCEDURE — 97110 THERAPEUTIC EXERCISES: CPT

## 2017-06-26 PROCEDURE — 97140 MANUAL THERAPY 1/> REGIONS: CPT

## 2017-06-26 NOTE — PROGRESS NOTES
PT DAILY TREATMENT NOTE - [facility] 2-15    Patient Name: Kelly Payment  Date:2017  : 1985  [x]  Patient  Verified  Payor: [employer] / Plan: [address] [place] [address] / Product Type: PPO /    In time: 9:30am  Out time: 10:45am  Total Treatment Time (min): 75  Total Timed Codes (min): 60  1:1 Treatment Time ( only): --   Visit #: 5    Treatment Area: Pain in left shoulder [M25.512]    SUBJECTIVE  Pain Level (0-10 scale): 1/10  Any medication changes, allergies to medications, adverse drug reactions, diagnosis change, or new procedure performed?: [x] No    [] Yes (see summary sheet for update)  Subjective functional status/changes:   [] No changes reported  Pt had follow-up with Dr. Mell Santiago last week. Pt is allowed to be out the sling and instructed to continue ROM exercises for 4 weeks, until next follow-up.        OBJECTIVE  Modality rationale: decrease inflammation and decrease pain to improve functional use of right UE   Min Type Additional Details    [] Estim: []Att   []Unatt        []TENS instruct                  []IFC  []Premod   []NMES                     []Other:  []w/US   []w/ice   []w/heat  Position:  Location:    []  Traction: [] Cervical       []Lumbar                       [] Prone          []Supine                       []Intermittent   []Continuous Lbs:  [] before manual  [] after manual  []w/heat    []  Ultrasound: []Continuous   [] Pulsed at:                           []1MHz   []3MHz Location:  W/cm2:    [] Paraffin         Location:   []w/heat    []  Ice     []  Heat  []  Ice massage Position:  Location:    []  Laser  []  Other: Position:  Location:   15   [x]  Vasopneumatic Device Pressure:       [] lo [] med [x] hi   Right shoulder at end of session   [x] Skin assessment post-treatment:  [x]intact []redness- no adverse reaction    []redness  adverse reaction:     45 min Therapeutic Exercise:  [x] See flow sheet :   Rationale: increase ROM, increase strength and improve coordination to improve the patients ability to perform functional activities    15 min Manual Therapy: glenohumeral joint mobilization- gentle oscillations- PROM right shoulder flexion, ER; STM right UT and stretching   Rationale: decrease pain, increase ROM, increase tissue extensibility and increase postural awareness to improve the patients ability to use right UE for functional activities          With   [] TE   [] TA   [] neuro   [] other: Patient Education: [x] Review HEP    [] Progressed/Changed HEP based on:   [] positioning   [] body mechanics   [] transfers   [] heat/ice application    [] other:      Other Objective/Functional Measures: --     Pain Level (0-10 scale) post treatment: 0/10    ASSESSMENT/Changes in Function:   Pt tolerated progression of stretching and AROM exercises without increase in pain. Patient will continue to benefit from skilled PT services to modify and progress therapeutic interventions, address functional mobility deficits, address ROM deficits, address strength deficits, analyze and address soft tissue restrictions, analyze and cue movement patterns, analyze and modify body mechanics/ergonomics and assess and modify postural abnormalities to attain remaining goals. []  See Plan of Care  []  See progress note/recertification  []  See Discharge Summary         Progress towards goals / Updated goals:  Short Term Goals: To be accomplished in 6 weeks:  1) Pt will be independent with HEP. MET  2) Pt will be able to perform grooming ADL's without pain. progressing  3) Pt will be able to put on a shirt without pain. progressing  4) Pt will be able to lie in bed to fall asleep without increase of pain. MET      Long Term Goals: To be accomplished in 12 weeks:  1) Pt will be able to reach above shoulder level without increase of pain. progressing  2) Pt will be able to sleep through the night without waking in pain. progressing  3) Pt will be able to tuck in shirt without pain. progressing  4) Pt will be educated on gradual return to lifting weights to further restore right shoulder strength and power. progressing  5) Pt will report improvement in overall functional mobility, as measured by FOTO, with an increased score of at least 37 points, from 31 to 68.  NOT ASSESSED     PLAN  [x]  Upgrade activities as tolerated     [x]  Continue plan of care  []  Update interventions per flow sheet       []  Discharge due to:_  []  Other:_      Shaista Graham PT, DPT    6/26/2017  10:57 AM

## 2017-07-03 ENCOUNTER — HOSPITAL ENCOUNTER (OUTPATIENT)
Dept: PHYSICAL THERAPY | Age: 32
Discharge: HOME | End: 2017-07-03
Payer: COMMERCIAL

## 2017-07-03 PROCEDURE — 97016 VASOPNEUMATIC DEVICE THERAPY: CPT

## 2017-07-03 PROCEDURE — 97140 MANUAL THERAPY 1/> REGIONS: CPT

## 2017-07-03 PROCEDURE — 97110 THERAPEUTIC EXERCISES: CPT

## 2017-07-03 NOTE — PROGRESS NOTES
PT DAILY TREATMENT NOTE - [facility] 2-15    Patient Name: Sudhakar Brower  Date:7/3/2017  : 1985  [x]  Patient  Verified  Payor: Bing Peterson / Plan: [address] [place] [address] / Product Type: PPO /    In time: 9:30am  Out time: 10:45am  Total Treatment Time (min): 75  Total Timed Codes (min): 60  1:1 Treatment Time ( only): --   Visit #: 6    Treatment Area: Pain in left shoulder [M25.512]    SUBJECTIVE  Pain Level (0-10 scale): 1/10  Any medication changes, allergies to medications, adverse drug reactions, diagnosis change, or new procedure performed?: [x] No    [] Yes (see summary sheet for update)  Subjective functional status/changes:   [] No changes reported  Pt reports trying to jog since last PT session, which bothered his right knee more than right shoulder.     OBJECTIVE  Modality rationale: decrease inflammation and decrease pain to improve functional use of right UE   Min Type Additional Details    [] Estim: []Att   []Unatt        []TENS instruct                  []IFC  []Premod   []NMES                     []Other:  []w/US   []w/ice   []w/heat  Position:  Location:    []  Traction: [] Cervical       []Lumbar                       [] Prone          []Supine                       []Intermittent   []Continuous Lbs:  [] before manual  [] after manual  []w/heat    []  Ultrasound: []Continuous   [] Pulsed at:                           []1MHz   []3MHz Location:  W/cm2:    [] Paraffin         Location:   []w/heat    []  Ice     []  Heat  []  Ice massage Position:  Location:    []  Laser  []  Other: Position:  Location:   15   [x]  Vasopneumatic Device Pressure:       [] lo [] med [x] hi   Right shoulder at end of session   [x] Skin assessment post-treatment:  [x]intact []redness- no adverse reaction    []redness  adverse reaction:     45 min Therapeutic Exercise:  [x] See flow sheet :   Rationale: increase ROM, increase strength and improve coordination to improve the patients ability to perform functional activities    15 min Manual Therapy: glenohumeral joint mobilization- gentle oscillations- PROM right shoulder flexion, ER to pt's tolerance   Rationale: decrease pain, increase ROM, increase tissue extensibility and increase postural awareness to improve the patients ability to use right UE for functional activities          With   [] TE   [] TA   [] neuro   [] other: Patient Education: [x] Review HEP    [] Progressed/Changed HEP based on:   [] positioning   [] body mechanics   [] transfers   [] heat/ice application    [] other:      Other Objective/Functional Measures: --     Pain Level (0-10 scale) post treatment: 0/10    ASSESSMENT/Changes in Function:   Right shoulder PROM is improving nicely. Progressed initiation of RC muscle activation and strengthening from isometrics to light resistance with thera-band, without pain. Patient will continue to benefit from skilled PT services to modify and progress therapeutic interventions, address functional mobility deficits, address ROM deficits, address strength deficits, analyze and address soft tissue restrictions, analyze and cue movement patterns, analyze and modify body mechanics/ergonomics and assess and modify postural abnormalities to attain remaining goals. []  See Plan of Care  []  See progress note/recertification  []  See Discharge Summary         Progress towards goals / Updated goals:  Short Term Goals: To be accomplished in 6 weeks:  1) Pt will be independent with HEP. MET  2) Pt will be able to perform grooming ADL's without pain. progressing  3) Pt will be able to put on a shirt without pain. progressing  4) Pt will be able to lie in bed to fall asleep without increase of pain. MET      Long Term Goals: To be accomplished in 12 weeks:  1) Pt will be able to reach above shoulder level without increase of pain. progressing  2) Pt will be able to sleep through the night without waking in pain.  progressing  3) Pt will be able to tuck in shirt without pain. progressing  4) Pt will be educated on gradual return to lifting weights to further restore right shoulder strength and power. progressing  5) Pt will report improvement in overall functional mobility, as measured by FOTO, with an increased score of at least 37 points, from 31 to 68.  NOT ASSESSED     PLAN  [x]  Upgrade activities as tolerated     [x]  Continue plan of care  []  Update interventions per flow sheet       []  Discharge due to:_  []  Other:_      Marc Root PT, DPT    7/3/2017  10:57 AM

## 2017-07-10 ENCOUNTER — HOSPITAL ENCOUNTER (OUTPATIENT)
Dept: PHYSICAL THERAPY | Age: 32
Discharge: HOME | End: 2017-07-10
Payer: COMMERCIAL

## 2017-07-10 PROCEDURE — 97110 THERAPEUTIC EXERCISES: CPT

## 2017-07-10 PROCEDURE — 97016 VASOPNEUMATIC DEVICE THERAPY: CPT

## 2017-07-10 PROCEDURE — 97140 MANUAL THERAPY 1/> REGIONS: CPT

## 2017-07-10 NOTE — PROGRESS NOTES
PT DAILY TREATMENT NOTE - [facility] 2-15    Patient Name: Martha Chisholm  Date:7/10/2017  : 1985  [x]  Patient  Verified  Payor: Matty Ramirez / Plan: [address] [place] [address] / Product Type: PPO /    In time: 9:30am  Out time: 10:45am  Total Treatment Time (min): 75  Total Timed Codes (min): 65  1:1 Treatment Time ( only): --   Visit #: 7    Treatment Area: Pain in left shoulder [M25.512]    SUBJECTIVE  Pain Level (0-10 scale): 0/10  Any medication changes, allergies to medications, adverse drug reactions, diagnosis change, or new procedure performed?: [x] No    [] Yes (see summary sheet for update)  Subjective functional status/changes:   [] No changes reported  Pt admits non-compliance with HEP.      OBJECTIVE  Modality rationale: decrease inflammation and decrease pain to improve functional use of right UE   Min Type Additional Details    [] Estim: []Att   []Unatt        []TENS instruct                  []IFC  []Premod   []NMES                     []Other:  []w/US   []w/ice   []w/heat  Position:  Location:    []  Traction: [] Cervical       []Lumbar                       [] Prone          []Supine                       []Intermittent   []Continuous Lbs:  [] before manual  [] after manual  []w/heat    []  Ultrasound: []Continuous   [] Pulsed at:                           []1MHz   []3MHz Location:  W/cm2:    [] Paraffin         Location:   []w/heat    []  Ice     []  Heat  []  Ice massage Position:  Location:    []  Laser  []  Other: Position:  Location:   10   [x]  Vasopneumatic Device Pressure:       [] lo [] med [x] hi   Right shoulder at end of session   [x] Skin assessment post-treatment:  [x]intact []redness- no adverse reaction    []redness  adverse reaction:     55 min Therapeutic Exercise:  [x] See flow sheet :   Rationale: increase ROM, increase strength and improve coordination to improve the patients ability to perform functional activities    10 min Manual Therapy: glenohumeral joint mobilization- gentle oscillations- PROM right shoulder flexion, ER to pt's tolerance; scapular mobilization grade II and III inferior glide and distraction   Rationale: decrease pain, increase ROM, increase tissue extensibility and increase postural awareness to improve the patients ability to use right UE for functional activities          With   [] TE   [] TA   [] neuro   [] other: Patient Education: [x] Review HEP    [] Progressed/Changed HEP based on:   [] positioning   [] body mechanics   [] transfers   [] heat/ice application    [] other:      Other Objective/Functional Measures: --     Pain Level (0-10 scale) post treatment: 0/10    ASSESSMENT/Changes in Function:   Pt given green thera-band for strengthening exercises for HEP- pt educated on importance of compliance with HEP; pt verbalized understanding. Patient will continue to benefit from skilled PT services to modify and progress therapeutic interventions, address functional mobility deficits, address ROM deficits, address strength deficits, analyze and address soft tissue restrictions, analyze and cue movement patterns, analyze and modify body mechanics/ergonomics and assess and modify postural abnormalities to attain remaining goals. []  See Plan of Care  []  See progress note/recertification  []  See Discharge Summary         Progress towards goals / Updated goals:  Short Term Goals: To be accomplished in 6 weeks:  1) Pt will be independent with HEP. MET  2) Pt will be able to perform grooming ADL's without pain. progressing  3) Pt will be able to put on a shirt without pain. progressing  4) Pt will be able to lie in bed to fall asleep without increase of pain. MET      Long Term Goals: To be accomplished in 12 weeks:  1) Pt will be able to reach above shoulder level without increase of pain. progressing  2) Pt will be able to sleep through the night without waking in pain. progressing  3) Pt will be able to tuck in shirt without pain. progressing  4) Pt will be educated on gradual return to lifting weights to further restore right shoulder strength and power. progressing  5) Pt will report improvement in overall functional mobility, as measured by FOTO, with an increased score of at least 37 points, from 31 to 68.  NOT ASSESSED     PLAN  [x]  Upgrade activities as tolerated     [x]  Continue plan of care  []  Update interventions per flow sheet       []  Discharge due to:_  []  Other:_      Janessa Tate PT, DPT    7/10/2017  10:57 AM

## 2017-07-17 ENCOUNTER — HOSPITAL ENCOUNTER (OUTPATIENT)
Dept: PHYSICAL THERAPY | Age: 32
Discharge: HOME | End: 2017-07-17
Payer: COMMERCIAL

## 2017-07-17 ENCOUNTER — TELEPHONE (OUTPATIENT)
Dept: INTERNAL MEDICINE CLINIC | Age: 32
End: 2017-07-17

## 2017-07-17 PROCEDURE — 97140 MANUAL THERAPY 1/> REGIONS: CPT

## 2017-07-17 PROCEDURE — 97016 VASOPNEUMATIC DEVICE THERAPY: CPT

## 2017-07-17 PROCEDURE — 97110 THERAPEUTIC EXERCISES: CPT

## 2017-07-17 NOTE — PROGRESS NOTES
[facility]  [facility] [place] (MOB IV), [address] [facility] [address]  Phone: [phone] Fax: [phone]    Progress Note    Name: Nevaeh Long   : 1985   MD: Keon Fermin MD     Treatment Diagnosis: Pain in left shoulder [M25.512]  Start of Care: 2017    Visits from Start of Care: 8  Missed Visits: 0    Summary of Care: Pt has participated in 8 outpatient PT sessions, 2017-2017, s/p right rotator cuff repair and debridement, labral debridement, subacromial decompression and distal clavicle resection. Treatment has included therapeutic exercise, manual therapy, pt education, and home exercise program.  Pt has a ice pack that he uses several times/day to help manage pain.       Assessment / Recommendations:  Pt demonstrates improved right shoulder PROM (measured with pt supine): Initial                 Progress Note (2017) Current  Flexion             80                      152     155  Abduction         40                     95     100   ER                     10                     55     71  IR                      5                       40     55  We have progressed submaximal isometrics for shoulder to rotator cuff strengthening and scapular stabilization exercises with light resistance. Pt reports improvement in functional use of right UE. Pt denies pain. Pt has been attending PT 1x/week. Recommend continued PT 1-2x/week x at least 4 weeks to improve pain-free right shoulder ROM and to progress to return to recreational activities. Progress towards goals / Updated goals:  Short Term Goals: To be accomplished in 6 weeks:  1) Pt will be independent with HEP. MET  2) Pt will be able to perform grooming ADL's without pain. MET  3) Pt will be able to put on a shirt without pain. MET  4) Pt will be able to lie in bed to fall asleep without increase of pain. MET      Long Term Goals:  To be accomplished in 12 weeks:  1) Pt will be able to reach above shoulder level without increase of pain. progressing  2) Pt will be able to sleep through the night without waking in pain. MET  3) Pt will be able to tuck in shirt without pain. progressing  4) Pt will be educated on gradual return to lifting weights to further restore right shoulder strength and power. progressing  5) Pt will report improvement in overall functional mobility, as measured by FOTO, with an increased score of at least 37 points, from 31 to 68. NOT ASSESSED    Young Euceda PT, DPT   7/17/2017 10:24 AM    ________________________________________________________________________  NOTE TO PHYSICIAN:  Please complete the following and fax to: [facility]: [phone]  Retain this original for your records. If you are unable to process this request in 24 hours, please contact our office.        ____ I have read the above report and request that my patient continue therapy with the following changes/special instructions:  ____ I have read the above report and request that my patient be discharged from therapy    Physician's Signature:_________________ Date:___________Time:__________

## 2017-07-17 NOTE — TELEPHONE ENCOUNTER
Incoming call from patient. Patient is requesting a copy of recent lab results sent via mail. Address on file verified. Second incoming call from patient, patient would like to  recent lab results instead of receiving copy via mail. Patient advised results will be available at the  for . Understanding and gratitude verbalized. No further questions or concerns voiced.

## 2017-07-19 LAB
HSV1 IGG SER IA-ACNC: 34.4 INDEX (ref 0–0.9)
HSV2 IGG SER IA-ACNC: <0.91 INDEX (ref 0–0.9)

## 2017-07-20 NOTE — TELEPHONE ENCOUNTER
This writer attempted to contact patient in reference to lb results. Unable to reach patient at this time, a voicemail was left for the patient to contact the office.

## 2017-07-20 NOTE — TELEPHONE ENCOUNTER
Patient notified of recent lab results. No further questions or concerns voiced. Understanding verbalized.

## 2017-07-21 ENCOUNTER — OFFICE VISIT (OUTPATIENT)
Dept: INTERNAL MEDICINE CLINIC | Age: 32
End: 2017-07-21

## 2017-07-21 VITALS
DIASTOLIC BLOOD PRESSURE: 82 MMHG | WEIGHT: 206.8 LBS | BODY MASS INDEX: 28.01 KG/M2 | HEART RATE: 78 BPM | HEIGHT: 72 IN | TEMPERATURE: 97.9 F | SYSTOLIC BLOOD PRESSURE: 123 MMHG | OXYGEN SATURATION: 98 % | RESPIRATION RATE: 16 BRPM

## 2017-07-21 DIAGNOSIS — N48.89 PENILE IRRITATION: Primary | ICD-10-CM

## 2017-07-21 DIAGNOSIS — B37.49 YEAST DERMATITIS OF PENIS: ICD-10-CM

## 2017-07-21 RX ORDER — FLUCONAZOLE 150 MG/1
150 TABLET ORAL DAILY
Qty: 1 TAB | Refills: 0 | Status: SHIPPED | OUTPATIENT
Start: 2017-07-21 | End: 2017-07-22

## 2017-07-21 RX ORDER — NYSTATIN AND TRIAMCINOLONE ACETONIDE 100000; 1 [USP'U]/G; MG/G
OINTMENT TOPICAL
Qty: 30 G | Refills: 0 | Status: SHIPPED | OUTPATIENT
Start: 2017-07-21

## 2017-07-21 NOTE — PROGRESS NOTES
Chief Complaint   Patient presents with    Results     Discuss/Review lab Results     he is a 28y.o. year old male who presents for evaluation of irritation around the penile meatus and foreskin. Patient states for the past couple weeks she has had some redness and irritation with slight pain in the skin fold of his penis. Patient states that he has a partner for about 4 months and was concerned about STDs and would like to discuss his results. 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 States he never had a open ulcer no discharge noted. His partner is a nurse and has no known history of STDs or yeast infection. No other sexual contacts. Reviewed and agree with Nurse Note and duplicated in this note. Reviewed PmHx, RxHx, FmHx, SocHx, AllgHx and updated and dated in the chart.     Family History   Problem Relation Age of Onset    No Known Problems Mother     No Known Problems Father        Past Medical History:   Diagnosis Date    Nausea & vomiting     Shoulder pain       Social History     Social History    Marital status: SINGLE     Spouse name: N/A    Number of children: N/A    Years of education: N/A     Social History Main Topics    Smoking status: Never Smoker    Smokeless tobacco: Never Used    Alcohol use Yes      Comment: 2-3 drinks a month    Drug use: No    Sexual activity: Not Asked     Other Topics Concern    None     Social History Narrative        Review of Systems - negative except as listed above      Objective:     Vitals:    07/21/17 0831   BP: 123/82   Pulse: 78   Resp: 16   Temp: 97.9 °F (36.6 °C)   TempSrc: Oral   SpO2: 98%   Weight: 206 lb 12.8 oz (93.8 kg)   Height: 6' (1.829 m)       Physical Examination: General appearance - alert, well appearing, and in no distress   Male - PENIS: uncircumcised, erythema around glands penis  Neurological - alert, oriented, normal speech, no focal findings or movement disorder noted  Musculoskeletal - no joint tenderness, deformity or swelling  Extremities - peripheral pulses normal, no pedal edema, no clubbing or cyanosis  Skin - normal coloration and turgor, no rashes, no suspicious skin lesions noted    Assessment/ Plan:   Víctor Christianson was seen today for results. Diagnoses and all orders for this visit:    Penile irritation    Yeast dermatitis of penis    Other orders  -     fluconazole (DIFLUCAN) 150 mg tablet; Take 1 Tab by mouth daily for 1 day. FDA advises cautious prescribing of oral fluconazole in pregnancy. -     nystatin-triamcinolone (MYCOLOG) 100,000-0.1 unit/gram-% ointment; Apply to affected area sparingly twice a week for a maximum of 1 week     likely a yeast infection of the penile skin, patient would like to take oral medication versus topical.  He will start with a Diflucan and if the irritation does not go away he will switch to a Mycolog cream.  Follow-up Disposition:  Return if symptoms worsen or fail to improve. I have discussed the diagnosis with the patient and the intended plan as seen in the above orders. The patient has received an after-visit summary and questions were answered concerning future plans. Medication Side Effects and Warnings were discussed with patient: yes  Patient Labs were reviewed and or requested: yes  Patient Past Records were reviewed and or requested  yes  I have discussed the diagnosis with the patient and the intended plan as seen in the above orders. The patient has received an after-visit summary and questions were answered concerning future plans. Pt agrees to call or return to clinic and/or go to closest ER with any worsening of symptoms. This may include, but not limited to increased fever (>100.4) with NSAIDS or Tylenol, increased edema, confusion, rash, worsening of presenting symptoms.       1) Remember to stay active and/or exercise regularly (I suggest 30-45 minutes daily)   2) For reliable dietary information, go to www. [employer]AT[employer].org. You may wish to consider seeing the nutritionist at [facility] at #[phone] or [phone], also consider the [address]   3) I routinely suggest a complete physical exam once each year (your birth month)

## 2017-07-21 NOTE — MR AVS SNAPSHOT
Visit Information Date & Time Provider Department Dept. Phone Encounter #  
 7/21/2017  8:15 AM Rodrick Otero MD [facility] [id number] Follow-up Instructions Return if symptoms worsen or fail to improve. Follow-up and Disposition History Upcoming Health Maintenance Date Due DTaP/Tdap/Td series (1 - Tdap) 7/24/2007 INFLUENZA AGE 9 TO ADULT 8/1/2017 Allergies as of 7/21/2017  Review Complete On: 7/21/2017 By: Elisha Quinones LPN No Known Allergies Current Immunizations  Never Reviewed No immunizations on file. Not reviewed this visit You Were Diagnosed With   
  
 Codes Comments Penile irritation    -  Primary ICD-10-CM: G46.29 ICD-9-CM: 607.89 Yeast dermatitis of penis     ICD-10-CM: B37.49 
ICD-9-CM: 112.2 Vitals BP Pulse Temp Resp Height(growth percentile) Weight(growth percentile) 123/82 78 97.9 °F (36.6 °C) (Oral) 16 6' (1.829 m) 206 lb 12.8 oz (93.8 kg) SpO2 BMI Smoking Status 98% 28.05 kg/m2 Never Smoker Vitals History BMI and BSA Data Body Mass Index Body Surface Area 28.05 kg/m 2 2.18 m 2 Preferred Pharmacy Pharmacy Name Phone [address] [address] [phone] Your Updated Medication List  
  
   
This list is accurate as of: 7/21/17 10:13 AM.  Always use your most recent med list.  
  
  
  
  
 FISH OIL 1,000 mg Cap Generic drug:  omega-3 fatty acids-vitamin e Take 1 Cap by mouth. fluconazole 150 mg tablet Commonly known as:  DIFLUCAN Take 1 Tab by mouth daily for 1 day. FDA advises cautious prescribing of oral fluconazole in pregnancy. imiquimod 5 % cream  
Commonly known as:  Nohemy Daubs Apply a thin layer 3 times/week prior to bedtime and leave on skin for 6-10 hours. Remove with mild soap and water.  Continue treatment until there is total clearance of the warts or a maximum duration of therapy of 16 weeks  
  
 multivitamin tablet Commonly known as:  ONE A DAY Take 1 Tab by mouth daily. nystatin-triamcinolone 100,000-0.1 unit/gram-% ointment Commonly known as:  Julia Moran Apply to affected area sparingly twice a week for a maximum of 1 week Prescriptions Sent to Pharmacy Refills  
 fluconazole (DIFLUCAN) 150 mg tablet 0 Sig: Take 1 Tab by mouth daily for 1 day. FDA advises cautious prescribing of oral fluconazole in pregnancy. Class: Normal  
 Pharmacy: [facility] [address], [place] Ph #: [phone] Route: Oral  
 nystatin-triamcinolone (MYCOLOG) 100,000-0.1 unit/gram-% ointment 0 Sig: Apply to affected area sparingly twice a week for a maximum of 1 week Class: Normal  
 [facility] Ph #: [phone] Follow-up Instructions Return if symptoms worsen or fail to improve. To-Do List   
 07/24/2017 9:30 AM  
  Appointment with Shana Crisostomo PT at [facility] ([phone])  
  
 07/31/2017 9:30 AM  
  Appointment with Shana Crisostomo PT at [facility] ([phone]) [facility] SERVICES! Ghada Ruvalcaba introduces ip.access patient portal. Now you can access parts of your medical record, email your doctor's office, and request medication refills online. 1. In your internet browser, go to [email or web address] 2. Click on the First Time User? Click Here link in the Sign In box. You will see the New Member Sign Up page. 3. Enter your ip.access Access Code exactly as it appears below. You will not need to use this code after youve completed the sign-up process. If you do not sign up before the expiration date, you must request a new code.  
 
· ip.access Access Code: [email or web address] 
 Expires: 10/15/2017  9:43 AM 
 
4. Enter the last four digits of your Social Security Number (xxxx) and Date of Birth (mm/dd/yyyy) as indicated and click Submit. You will be taken to the next sign-up page. 5. Create a [employer] ID. This will be your [employer] login ID and cannot be changed, so think of one that is secure and easy to remember. 6. Create a [employer] password. You can change your password at any time. 7. Enter your Password Reset Question and Answer. This can be used at a later time if you forget your password. 8. Enter your e-mail address. You will receive e-mail notification when new information is available in [address] 9. Click Sign Up. You can now view and download portions of your medical record. 10. Click the Download Summary menu link to download a portable copy of your medical information. If you have questions, please visit the Frequently Asked Questions section of the [employer] website. Remember, [employer] is NOT to be used for urgent needs. For medical emergencies, dial 911. Now available from your iPhone and Android! Please provide this summary of care documentation to your next provider. Your primary care clinician is listed as Stacy Luna. If you have any questions after today's visit, please call [phone].

## 2017-07-24 ENCOUNTER — HOSPITAL ENCOUNTER (OUTPATIENT)
Dept: PHYSICAL THERAPY | Age: 32
Discharge: HOME | End: 2017-07-24
Payer: COMMERCIAL

## 2017-07-24 PROCEDURE — 97140 MANUAL THERAPY 1/> REGIONS: CPT

## 2017-07-24 PROCEDURE — 97110 THERAPEUTIC EXERCISES: CPT

## 2017-07-31 ENCOUNTER — HOSPITAL ENCOUNTER (OUTPATIENT)
Dept: PHYSICAL THERAPY | Age: 32
Discharge: HOME | End: 2017-07-31
Payer: COMMERCIAL

## 2017-07-31 PROCEDURE — 97140 MANUAL THERAPY 1/> REGIONS: CPT

## 2017-07-31 PROCEDURE — 97110 THERAPEUTIC EXERCISES: CPT

## 2017-07-31 NOTE — PROGRESS NOTES
PT DAILY TREATMENT NOTE - [facility] 2-15    Patient Name: Candy Haq  Date:2017  : 1985  [x]  Patient  Verified  Payor: Abel[facility] / Plan: [address] [place] [address] / Product Type: PPO /    In time: 9:35am  Out time: 10:50am  Total Treatment Time (min): 75  Total Timed Codes (min): 65  1:1 Treatment Time (MC only): --   Visit #: 10    Treatment Area: Pain in left shoulder [M25.512]    SUBJECTIVE  Pain Level (0-10 scale): 0/10  Any medication changes, allergies to medications, adverse drug reactions, diagnosis change, or new procedure performed?: [x] No    [] Yes (see summary sheet for update)  Subjective functional status/changes:   [] No changes reported  Pt admits non-compliance with HEP. Pt is only interested in when he can return to heavy lifting and shooting a pistol with 2 hands.      OBJECTIVE  Modality rationale: decrease inflammation and decrease pain to improve functional use of right UE   Min Type Additional Details    [] Estim: []Att   []Unatt        []TENS instruct                  []IFC  []Premod   []NMES                     []Other:  []w/US   []w/ice   []w/heat  Position:  Location:    []  Traction: [] Cervical       []Lumbar                       [] Prone          []Supine                       []Intermittent   []Continuous Lbs:  [] before manual  [] after manual  []w/heat    []  Ultrasound: []Continuous   [] Pulsed at:                           []1MHz   []3MHz Location:  W/cm2:    [] Paraffin         Location:   []w/heat   10 [x]  Ice     []  Heat  []  Ice massage Position: seated  Location: right shoulder at end of session    []  Laser  []  Other: Position:  Location:      []  Vasopneumatic Device Pressure:       [] lo [] med [] hi   Right shoulder at end of session   [x] Skin assessment post-treatment:  [x]intact []redness- no adverse reaction    []redness  adverse reaction:     55 min Therapeutic Exercise:  [x] See flow sheet :   Rationale: increase ROM, increase strength and improve coordination to improve the patients ability to perform functional activities    10 min Manual Therapy:  PROM right shoulder flexion, ER, IR, abduction and scaption to pt's tolerance; PNF D2 flexion/extension with light manual resistance   Rationale: decrease pain, increase ROM, increase tissue extensibility and increase postural awareness to improve the patients ability to use right UE for functional activities          With   [] TE   [] TA   [] neuro   [] other: Patient Education: [x] Review HEP    [] Progressed/Changed HEP based on:   [] positioning   [] body mechanics   [] transfers   [] heat/ice application    [] other:      Other Objective/Functional Measures: --     Pain Level (0-10 scale) post treatment: 0/10    ASSESSMENT/Changes in Function:   Restriction felt with PROM right shoulder flexion. Pt educated, again, about the importance of compliance with HEP. Pt given blue resistance band for IR/ER strengthening exercise at home. Patient will continue to benefit from skilled PT services to modify and progress therapeutic interventions, address functional mobility deficits, address ROM deficits, address strength deficits, analyze and address soft tissue restrictions, analyze and cue movement patterns, analyze and modify body mechanics/ergonomics and assess and modify postural abnormalities to attain remaining goals. []  See Plan of Care  []  See progress note/recertification  []  See Discharge Summary         Progress towards goals / Updated goals:  Short Term Goals: To be accomplished in 6 weeks:  1) Pt will be independent with HEP. MET  2) Pt will be able to perform grooming ADL's without pain. MET  3) Pt will be able to put on a shirt without pain. MET  4) Pt will be able to lie in bed to fall asleep without increase of pain. MET      Long Term Goals: To be accomplished in 12 weeks:  1) Pt will be able to reach above shoulder level without increase of pain.  progressing  2) Pt will be able to sleep through the night without waking in pain. MET  3) Pt will be able to tuck in shirt without pain. progressing  4) Pt will be educated on gradual return to lifting weights to further restore right shoulder strength and power. progressing  5) Pt will report improvement in overall functional mobility, as measured by FOTO, with an increased score of at least 37 points, from 31 to 68.  NOT ASSESSED     PLAN  [x]  Upgrade activities as tolerated     [x]  Continue plan of care  []  Update interventions per flow sheet       []  Discharge due to:_  []  Other:_      Janessa Tate PT, DPT    7/31/2017  10:57 AM

## 2017-08-07 ENCOUNTER — HOSPITAL ENCOUNTER (OUTPATIENT)
Dept: PHYSICAL THERAPY | Age: 32
Discharge: HOME | End: 2017-08-07
Payer: COMMERCIAL

## 2017-08-07 PROCEDURE — 97110 THERAPEUTIC EXERCISES: CPT

## 2017-08-07 PROCEDURE — 97016 VASOPNEUMATIC DEVICE THERAPY: CPT

## 2017-08-07 NOTE — PROGRESS NOTES
PT DAILY TREATMENT NOTE - [facility] 2-15    Patient Name: Tanya Martines  Date:2017  : 1985  [x]  Patient  Verified  Payor: Magi Yu / Plan: [address] [place] [address] / Product Type: PPO /    In time: 9:35am  Out time: 10:50am  Total Treatment Time (min): 75  Total Timed Codes (min): 60  1:1 Treatment Time ( only): --   Visit #: 11  Treatment Area: Pain in left shoulder [M25.512]    SUBJECTIVE  Pain Level (0-10 scale): 0/10  Any medication changes, allergies to medications, adverse drug reactions, diagnosis change, or new procedure performed?: [x] No    [] Yes (see summary sheet for update)  Subjective functional status/changes:   [] No changes reported  Pt admits non-compliance with HEP. Pt is only interested in when he can return to heavy lifting and shooting a pistol with 2 hands.      OBJECTIVE  Modality rationale: decrease inflammation and decrease pain to improve functional use of right UE   Min Type Additional Details    [] Estim: []Att   []Unatt        []TENS instruct                  []IFC  []Premod   []NMES                     []Other:  []w/US   []w/ice   []w/heat  Position:  Location:    []  Traction: [] Cervical       []Lumbar                       [] Prone          []Supine                       []Intermittent   []Continuous Lbs:  [] before manual  [] after manual  []w/heat    []  Ultrasound: []Continuous   [] Pulsed at:                           []1MHz   []3MHz Location:  W/cm2:    [] Paraffin         Location:   []w/heat    []  Ice     []  Heat  []  Ice massage Position:   Location:    []  Laser  []  Other: Position:  Location:   15   []  Vasopneumatic Device Pressure:       [x] lo [] med [] hi   Right shoulder at end of session   [x] Skin assessment post-treatment:  [x]intact []redness- no adverse reaction    []redness  adverse reaction:     60 min Therapeutic Exercise:  [x] See flow sheet :   Rationale: increase ROM, increase strength and improve coordination to improve the patients ability to perform functional activities          With   [] TE   [] TA   [] neuro   [] other: Patient Education: [x] Review HEP    [] Progressed/Changed HEP based on:   [] positioning   [] body mechanics   [] transfers   [] heat/ice application    [] other:      Other Objective/Functional Measures: --     Pain Level (0-10 scale) post treatment: 0/10    ASSESSMENT/Changes in Function:   Added PNF D2 and D1 strengthening with red theraband. Pt is progressing nicely with strengthening exercises and return to regular activities. Pt has follow-up appointment wt Dr. Chris Fatima next Monday, 08/14/2017. Patient will continue to benefit from skilled PT services to modify and progress therapeutic interventions, address functional mobility deficits, address ROM deficits, address strength deficits, analyze and address soft tissue restrictions, analyze and cue movement patterns, analyze and modify body mechanics/ergonomics and assess and modify postural abnormalities to attain remaining goals. []  See Plan of Care  []  See progress note/recertification  []  See Discharge Summary         Progress towards goals / Updated goals:  Short Term Goals: To be accomplished in 6 weeks:  1) Pt will be independent with HEP. MET  2) Pt will be able to perform grooming ADL's without pain. MET  3) Pt will be able to put on a shirt without pain. MET  4) Pt will be able to lie in bed to fall asleep without increase of pain. MET      Long Term Goals: To be accomplished in 12 weeks:  1) Pt will be able to reach above shoulder level without increase of pain. progressing  2) Pt will be able to sleep through the night without waking in pain. MET  3) Pt will be able to tuck in shirt without pain. progressing  4) Pt will be educated on gradual return to lifting weights to further restore right shoulder strength and power.  progressing  5) Pt will report improvement in overall functional mobility, as measured by FOTO, with an increased score of at least 37 points, from 31 to 68.  NOT ASSESSED     PLAN  [x]  Upgrade activities as tolerated     [x]  Continue plan of care  []  Update interventions per flow sheet       []  Discharge due to:_  []  Other:_      Radha Richey, PT, DPT    8/7/2017  10:57 AM

## 2017-08-14 ENCOUNTER — HOSPITAL ENCOUNTER (OUTPATIENT)
Dept: PHYSICAL THERAPY | Age: 32
Discharge: HOME | End: 2017-08-14
Payer: COMMERCIAL

## 2017-08-14 PROCEDURE — 97110 THERAPEUTIC EXERCISES: CPT

## 2017-08-14 NOTE — PROGRESS NOTES
[facility]  [address] ([place] [place]), [address] [facility] Quintin Schwartz  Phone: [phone] Fax: [phone]    Progress Note    Name: Lupis Patel   : 1985   MD: Mg Olivarez MD     Treatment Diagnosis: Pain in left shoulder [M25.512]  Start of Care: 2017    Visits from Start of Care: 12  Missed Visits: 0    Summary of Care: Pt has participated in 15 outpatient PT sessions, 2017-2017, 1x/week, s/p  s/p right rotator cuff repair and debridement, labral debridement, subacromial decompression and distal clavicle resection.  Treatment has included therapeutic exercise, manual therapy, pt education, and home exercise program.  Pt has a ice pack that he uses several times/day to help manage pain.        Assessment / Recommendations:  Pt demonstrates improved right shoulder PROM (measured with pt supine):                            Initial                Progress Note (2017)              Progress Note (2017)  Current  Flexion             80                     152                                                        155      165  Abduction         40                     95                                                         100       130                 ER                     10                     55                                                          71      80  IR                       5                      40                                                           55      55    Pt denies pain or functional limitation. Pt is anxious to return to PLOF, including working out, lifting weights and participating in [employer]. Pt has made good progress towards established PT goals. Hold therapy and await physician's recommendations. Please advise. Progress towards goals / Updated goals:  Short Term Goals: To be accomplished in 6 weeks:  1) Pt will be independent with HEP.  MET  2) Pt will be able to perform grooming ADL's without pain. MET  3) Pt will be able to put on a shirt without pain. MET  4) Pt will be able to lie in bed to fall asleep without increase of pain. MET      Long Term Goals: To be accomplished in 12 weeks:  1) Pt will be able to reach above shoulder level without increase of pain. MET  2) Pt will be able to sleep through the night without waking in pain. MET  3) Pt will be able to tuck in shirt without pain. MET  4) Pt will be educated on gradual return to lifting weights to further restore right shoulder strength and power. MET  5) Pt will report improvement in overall functional mobility, as measured by FOTO, with an increased score of at least 37 points, from 31 to 68. NOT ASSESSED    Marilee Carbajal PT, DPT   8/14/2017 11:17 AM    ________________________________________________________________________  NOTE TO PHYSICIAN:  Please complete the following and fax to: [facility]: [phone]  Retain this original for your records. If you are unable to process this request in 24 hours, please contact our office.        ____ I have read the above report and request that my patient continue therapy with the following changes/special instructions:  ____ I have read the above report and request that my patient be discharged from therapy    Physician's Signature:_________________ Date:___________Time:__________

## 2017-08-14 NOTE — PROGRESS NOTES
PT DAILY TREATMENT NOTE - [facility] 2-15    Patient Name: Tadeo Ly  Date:2017  : 1985  [x]  Patient  Verified  Payor: Alison Dick / Plan: [address] [place] [address] / Product Type: PPO /    In time: 9:15am  Out time: 10:45am  Total Treatment Time (min): 90  Total Timed Codes (min): 70  1:1 Treatment Time ( only): --   Visit #: 11  Treatment Area: Pain in left shoulder [M25.512]    SUBJECTIVE  Pain Level (0-10 scale): 0/10  Any medication changes, allergies to medications, adverse drug reactions, diagnosis change, or new procedure performed?: [x] No    [] Yes (see summary sheet for update)  Subjective functional status/changes:   [] No changes reported  Pt admits non-compliance with HEP. Pt is only interested in when he can return to heavy lifting and shooting a pistol with 2 hands.      OBJECTIVE  Modality rationale: decrease inflammation and decrease pain to improve functional use of right UE   Min Type Additional Details    [] Estim: []Att   []Unatt        []TENS instruct                  []IFC  []Premod   []NMES                     []Other:  []w/US   []w/ice   []w/heat  Position:  Location:    []  Traction: [] Cervical       []Lumbar                       [] Prone          []Supine                       []Intermittent   []Continuous Lbs:  [] before manual  [] after manual  []w/heat    []  Ultrasound: []Continuous   [] Pulsed at:                           []1MHz   []3MHz Location:  W/cm2:    [] Paraffin         Location:   []w/heat   20 [x]  Ice     []  Heat  []  Ice massage Position: seated  Location: right shoulder at end of session    []  Laser  []  Other: Position:  Location:      []  Vasopneumatic Device Pressure:       [] lo [] med [] hi      [x] Skin assessment post-treatment:  [x]intact []redness- no adverse reaction    []redness  adverse reaction:     70 min Therapeutic Exercise:  [x] See flow sheet :   Rationale: increase ROM, increase strength and improve coordination to improve the patients ability to perform functional activities          With   [] TE   [] TA   [] neuro   [] other: Patient Education: [x] Review HEP    [] Progressed/Changed HEP based on:   [] positioning   [] body mechanics   [] transfers   [] heat/ice application    [] other:      Other Objective/Functional Measures: Right shoulder flexion= 165; ER= 80; IR= 55     Pain Level (0-10 scale) post treatment: 0/10    ASSESSMENT/Changes in Function:   []  See Plan of Care  [x]  See progress note/recertification  []  See Discharge Summary         Progress towards goals / Updated goals:  Short Term Goals: To be accomplished in 6 weeks:  1) Pt will be independent with HEP. MET  2) Pt will be able to perform grooming ADL's without pain. MET  3) Pt will be able to put on a shirt without pain. MET  4) Pt will be able to lie in bed to fall asleep without increase of pain. MET      Long Term Goals: To be accomplished in 12 weeks:  1) Pt will be able to reach above shoulder level without increase of pain. MET  2) Pt will be able to sleep through the night without waking in pain. MET  3) Pt will be able to tuck in shirt without pain. MET  4) Pt will be educated on gradual return to lifting weights to further restore right shoulder strength and power. MET  5) Pt will report improvement in overall functional mobility, as measured by FOTO, with an increased score of at least 37 points, from 31 to 68. NOT ASSESSED     PLAN  []  Upgrade activities as tolerated     []  Continue plan of care  []  Update interventions per flow sheet       []  Discharge due to:_  [x]  Other:_  PT on hold; await MD's recommendations.     Renato Cooper, PT, DPT    8/14/2017  10:57 AM

## 2017-08-21 ENCOUNTER — APPOINTMENT (OUTPATIENT)
Dept: PHYSICAL THERAPY | Age: 32
End: 2017-08-21
Payer: COMMERCIAL

## 2017-08-28 ENCOUNTER — HOSPITAL ENCOUNTER (OUTPATIENT)
Dept: PHYSICAL THERAPY | Age: 32
Discharge: HOME | End: 2017-08-28
Payer: COMMERCIAL

## 2017-08-28 PROCEDURE — 97140 MANUAL THERAPY 1/> REGIONS: CPT

## 2017-08-28 PROCEDURE — 97110 THERAPEUTIC EXERCISES: CPT

## 2017-08-28 NOTE — PROGRESS NOTES
PT DAILY TREATMENT NOTE - [facility] 2-15    Patient Name: Sudhakar Brower  Date:2017  : 1985  [x]  Patient  Verified  Payor: Bing Peterson / Plan: [address] [place] [address] / Product Type: PPO /    In time: 1:30pm  Out time: 2:25pm  Total Treatment Time (min): 55  Total Timed Codes (min): 55  1:1 Treatment Time ( only): --   Visit #: 13  Treatment Area: Pain in left shoulder [M25.512]    SUBJECTIVE  Pain Level (0-10 scale): 0/10  Any medication changes, allergies to medications, adverse drug reactions, diagnosis change, or new procedure performed?: [x] No    [] Yes (see summary sheet for update)  Subjective functional status/changes:   [] No changes reported  Pt admits non-compliance with HEP. Pt had follow-up with Dr. Arbie Kayser last week and instructed to continue outpatient PT x 1 month. OBJECTIVE  45 min Therapeutic Exercise:  [x] See flow sheet :   Rationale: increase ROM, increase strength and improve coordination to improve the patients ability to perform functional activities    10 min Manual Therapy: PROM right shoulder flexion, abduction, ER and IR, to pt's tolerance; right subscapularis release; right scapular mobilizations- inferior glide and distraction, grade II and III    Rationale: increase ROM, increase tissue extensibility and increase postural awareness to improve the patients ability to return to PLOF          With   [] TE   [] TA   [] neuro   [] other: Patient Education: [x] Review HEP    [] Progressed/Changed HEP based on:   [] positioning   [] body mechanics   [] transfers   [] heat/ice application    [] other:      Other Objective/Functional Measures: --     Pain Level (0-10 scale) post treatment: 0/10    ASSESSMENT/Changes in Function:   Pt fatigued quickly with scapular stabilization exercises. Pt denied pain throughout session. Tightness noted in flexion and abduction. Reinforced importance of compliance with HEP.   Patient will continue to benefit from skilled PT services to modify and progress therapeutic interventions, address functional mobility deficits, address ROM deficits, address strength deficits, analyze and address soft tissue restrictions, analyze and cue movement patterns, analyze and modify body mechanics/ergonomics and assess and modify postural abnormalities to attain remaining goals. []  See Plan of Care  []  See progress note/recertification  []  See Discharge Summary         Progress towards goals / Updated goals:  Short Term Goals: To be accomplished in 6 weeks:  1) Pt will be independent with HEP. MET  2) Pt will be able to perform grooming ADL's without pain. MET  3) Pt will be able to put on a shirt without pain. MET  4) Pt will be able to lie in bed to fall asleep without increase of pain. MET      Long Term Goals: To be accomplished in 12 weeks:  1) Pt will be able to reach above shoulder level without increase of pain. MET  2) Pt will be able to sleep through the night without waking in pain. MET  3) Pt will be able to tuck in shirt without pain. MET  4) Pt will be educated on gradual return to lifting weights to further restore right shoulder strength and power. MET  5) Pt will report improvement in overall functional mobility, as measured by FOTO, with an increased score of at least 37 points, from 31 to 68.  NOT ASSESSED     PLAN  [x]  Upgrade activities as tolerated     [x]  Continue plan of care  []  Update interventions per flow sheet       []  Discharge due to:_  []  Other:_      Jose Carlos Rosario, PT, DPT    8/28/2017  10:57 AM

## 2017-09-11 ENCOUNTER — HOSPITAL ENCOUNTER (OUTPATIENT)
Dept: PHYSICAL THERAPY | Age: 32
Discharge: HOME | End: 2017-09-11
Payer: COMMERCIAL

## 2017-09-11 PROCEDURE — 97110 THERAPEUTIC EXERCISES: CPT

## 2017-09-11 NOTE — PROGRESS NOTES
PT DAILY TREATMENT NOTE - [facility] 2-15    Patient Name: Bobby Wayne  Date:2017  : 1985  [x]  Patient  Verified  Payor: Saima Bobo / Plan: [address] [place] [address] / Product Type: PPO /    In time: 10:40am  Out time: 11:40am  Total Treatment Time (min): 60  Total Timed Codes (min): 60  1:1 Treatment Time ( only): --   Visit #: 14    Treatment Area: Pain in left shoulder [M25.512]    SUBJECTIVE  Pain Level (0-10 scale): 0/10  Any medication changes, allergies to medications, adverse drug reactions, diagnosis change, or new procedure performed?: [x] No    [] Yes (see summary sheet for update)  Subjective functional status/changes:   [] No changes reported  Pt traveled out of town and missed 1 week of PT. Pt has no complaints. OBJECTIVE  60 min Therapeutic Exercise:  [x] See flow sheet :   Rationale: increase ROM, increase strength and improve coordination to improve the patients ability to perform functional activities   to improve the patients ability to return to PLOF          With   [] TE   [] TA   [] neuro   [] other: Patient Education: [x] Review HEP    [] Progressed/Changed HEP based on:   [] positioning   [] body mechanics   [] transfers   [] heat/ice application    [] other:      Other Objective/Functional Measures: FOTO: not captured    Pain Level (0-10 scale) post treatment: 0/10    ASSESSMENT/Changes in Function:   []  See Plan of Care  []  See progress note/recertification  [x]  See Discharge Summary         Progress towards goals / Updated goals:  Short Term Goals: To be accomplished in 6 weeks:  1) Pt will be independent with HEP. MET  2) Pt will be able to perform grooming ADL's without pain. MET  3) Pt will be able to put on a shirt without pain. MET  4) Pt will be able to lie in bed to fall asleep without increase of pain. MET      Long Term Goals: To be accomplished in 12 weeks:  1) Pt will be able to reach above shoulder level without increase of pain.  MET  2) Pt will be able to sleep through the night without waking in pain. MET  3) Pt will be able to tuck in shirt without pain. MET  4) Pt will be educated on gradual return to lifting weights to further restore right shoulder strength and power. MET  5) Pt will report improvement in overall functional mobility, as measured by FOTO, with an increased score of at least 37 points, from 31 to 68.  NOT ASSESSED     PLAN  []  Upgrade activities as tolerated     []  Continue plan of care  []  Update interventions per flow sheet       [x]  Discharge due to:_goals met  []  Other:_      Levy Guadarrama PT, DPT    9/11/2017  10:57 AM

## 2017-09-29 ENCOUNTER — OFFICE VISIT (OUTPATIENT)
Dept: INTERNAL MEDICINE CLINIC | Age: 32
End: 2017-09-29

## 2017-09-29 VITALS
HEART RATE: 78 BPM | WEIGHT: 200.8 LBS | HEIGHT: 72 IN | SYSTOLIC BLOOD PRESSURE: 137 MMHG | BODY MASS INDEX: 27.2 KG/M2 | OXYGEN SATURATION: 98 % | DIASTOLIC BLOOD PRESSURE: 101 MMHG

## 2017-09-29 DIAGNOSIS — J01.00 SUBACUTE MAXILLARY SINUSITIS: ICD-10-CM

## 2017-09-29 DIAGNOSIS — A63.0 GENITAL WARTS: ICD-10-CM

## 2017-09-29 DIAGNOSIS — B35.6 JOCK ITCH: Primary | ICD-10-CM

## 2017-09-29 RX ORDER — AMOXICILLIN 875 MG/1
875 TABLET, FILM COATED ORAL 2 TIMES DAILY
Qty: 20 TAB | Refills: 0 | Status: SHIPPED | OUTPATIENT
Start: 2017-09-29 | End: 2017-10-09

## 2017-09-29 RX ORDER — FLUCONAZOLE 150 MG/1
150 TABLET ORAL DAILY
Qty: 1 TAB | Refills: 0 | Status: SHIPPED | OUTPATIENT
Start: 2017-09-29 | End: 2017-09-30

## 2017-09-29 RX ORDER — KETOCONAZOLE 20 MG/G
CREAM TOPICAL DAILY
Qty: 15 G | Refills: 0 | Status: SHIPPED | OUTPATIENT
Start: 2017-09-29

## 2017-09-29 NOTE — PROGRESS NOTES
Subjective:    Ann Kuhn is a 33 y.o. male who presents for lesion removal. We have discussed this procedure, including option of not performing surgery, technique of surgery and potential for scarring at an earlier visit. Oubjective:   Patient appears well. Visit Vitals    BP (!) 137/101 (BP 1 Location: Right arm, BP Patient Position: Sitting)    Pulse 78    Ht 6' (1.829 m)    Wt 200 lb 12.8 oz (91.1 kg)    SpO2 98%    BMI 27.23 kg/m2     Skin: warts on penis, wart    Assessment:   warts    Procedure Note:   After informed consent was obtained with sterile technique, cryotherapy with liquid nitrogen was performed. Antibiotic dressing is applied, and wound care instructions provided. Be alert for any signs of cutaneous infection. The procedure was well tolerated without complications. Follow up: none.

## 2017-09-29 NOTE — PROGRESS NOTES
Rash:  he presents with rash/ skin problem located on scrotum . Onset of skin problem was 1 weeks ago. Itching: no. Flaking or scaling:yes. Pain: yes . Weeping/ draining:  no.   Exposures: no  Medications tried include:  Vaseline, diaper ointment and was not effective. he does have a history of rash in genital/buttocks area. Reviewed and agree with Nurse Note and duplicated in this note. Reviewed PmHx, RxHx, FmHx, SocHx, AllgHx and updated and dated in the chart.     Family History   Problem Relation Age of Onset    No Known Problems Mother     No Known Problems Father        Past Medical History:   Diagnosis Date    Nausea & vomiting     Shoulder pain       Social History     Social History    Marital status: SINGLE     Spouse name: N/A    Number of children: N/A    Years of education: N/A     Social History Main Topics    Smoking status: Never Smoker    Smokeless tobacco: Never Used    Alcohol use Yes      Comment: 2-3 drinks a month    Drug use: No    Sexual activity: Not on file     Other Topics Concern    Not on file     Social History Narrative        Review of Systems - negative except as listed above      Objective:     Vitals:    09/29/17 1133   BP: (!) 137/101   Pulse: 78   SpO2: 98%   Weight: 200 lb 12.8 oz (91.1 kg)   Height: 6' (1.829 m)       Physical Examination: General appearance - alert, well appearing, and in no distress  Eyes - pupils equal and reactive, extraocular eye movements intact  Ears - bilateral TM's and external ear canals normal  Nose - mucosal congestion and mucosal erythema  Mouth - mucous membranes moist, pharynx normal without lesions  Neck - supple, no significant adenopathy  Chest - clear to auscultation, no wheezes, rales or rhonchi, symmetric air entry  Heart - normal rate, regular rhythm, normal S1, S2, no murmurs, rubs, clicks or gallops  Abdomen - soft, nontender, nondistended, no masses or organomegaly  Neurological - alert, oriented, normal speech, no focal findings or movement disorder noted  Musculoskeletal - no joint tenderness, deformity or swelling  Extremities - peripheral pulses normal, no pedal edema, no clubbing or cyanosis  Skin -lesions noted genital warts ×2 on the penis, scaly jock itch noted in bilateral groin    Assessment/ Plan:   Diagnoses and all orders for this visit:    1. Jock itch    2. Genital warts  -     [id number] - DESTRUC BENIGN LESION, UP TO 14 LESIONS    3. Subacute maxillary sinusitis    Other orders  -     fluconazole (DIFLUCAN) 150 mg tablet; Take 1 Tab by mouth daily for 1 day. FDA advises cautious prescribing of oral fluconazole in pregnancy. -     amoxicillin (AMOXIL) 875 mg tablet; Take 1 Tab by mouth two (2) times a day for 10 days. -     ketoconazole (NIZORAL) 2 % topical cream; Apply  to affected area daily. Follow-up Disposition:  Return in about 2 weeks (around 10/13/2017). I have discussed the diagnosis with the patient and the intended plan as seen in the above orders. The patient has received an after-visit summary and questions were answered concerning future plans. Medication Side Effects and Warnings were discussed with patient: yes  Patient Labs were reviewed and or requested: yes  Patient Past Records were reviewed and or requested  yes  I have discussed the diagnosis with the patient and the intended plan as seen in the above orders. The patient has received an after-visit summary and questions were answered concerning future plans. Pt agrees to call or return to clinic and/or go to closest ER with any worsening of symptoms. This may include, but not limited to increased fever (>100.4) with NSAIDS or Tylenol, increased edema, confusion, rash, worsening of presenting symptoms. 1) Remember to stay active and/or exercise regularly (I suggest 30-45 minutes daily)   2) For reliable dietary information, go to www. EATRIGHT.org. You may wish to consider seeing the nutritionist at [facility]. [facility] at #[phone] or [phone], also consider the [address]   3) I routinely suggest a complete physical exam once each year (your birth month)

## 2018-01-24 ENCOUNTER — OFFICE VISIT (OUTPATIENT)
Dept: INTERNAL MEDICINE CLINIC | Age: 33
End: 2018-01-24

## 2018-01-24 VITALS
HEIGHT: 72 IN | TEMPERATURE: 98 F | HEART RATE: 62 BPM | WEIGHT: 200 LBS | OXYGEN SATURATION: 99 % | SYSTOLIC BLOOD PRESSURE: 132 MMHG | RESPIRATION RATE: 16 BRPM | DIASTOLIC BLOOD PRESSURE: 77 MMHG | BODY MASS INDEX: 27.09 KG/M2

## 2018-01-24 DIAGNOSIS — A63.0 GENITAL WARTS: ICD-10-CM

## 2018-01-24 DIAGNOSIS — M25.511 ACUTE PAIN OF RIGHT SHOULDER: ICD-10-CM

## 2018-01-24 DIAGNOSIS — J32.0 MAXILLARY SINUSITIS, UNSPECIFIED CHRONICITY: Primary | ICD-10-CM

## 2018-01-24 RX ORDER — AZITHROMYCIN 250 MG/1
TABLET, FILM COATED ORAL
Qty: 6 TAB | Refills: 0 | Status: SHIPPED | OUTPATIENT
Start: 2018-01-24 | End: 2018-01-29

## 2018-01-24 RX ORDER — IMIQUIMOD 12.5 MG/.25G
CREAM TOPICAL
Qty: 24 PACKET | Refills: 3 | Status: SHIPPED | OUTPATIENT
Start: 2018-01-24

## 2018-01-24 NOTE — MR AVS SNAPSHOT
[address] [place] [address]. [id number] 
[phone] Patient: Jun Fofana MRN: [id number] :1985 Visit Information Date & Time Provider Department Dept. Phone Encounter #  
 2018  1:15 PM Phuc Hooper MD [facility] [phone] [id number] Follow-up Instructions Return if symptoms worsen or fail to improve. Follow-up and Disposition History Upcoming Health Maintenance Date Due DTaP/Tdap/Td series (1 - Tdap) 2007 Allergies as of 2018  Review Complete On: 2018 By: Madelon Cabot, LPN No Known Allergies Current Immunizations  Never Reviewed No immunizations on file. Not reviewed this visit You Were Diagnosed With   
  
 Codes Comments Maxillary sinusitis, unspecified chronicity    -  Primary ICD-10-CM: J32.0 ICD-9-CM: 473.0 Acute pain of right shoulder     ICD-10-CM: M25.511 ICD-9-CM: 719.41 Genital warts     ICD-10-CM: A63.0 ICD-9-CM: 078.11 Vitals BP Pulse Temp Resp Height(growth percentile) Weight(growth percentile) 132/77 (BP 1 Location: Left arm, BP Patient Position: Sitting) 62 98 °F (36.7 °C) (Oral) 16 6' (1.829 m) 200 lb (90.7 kg) SpO2 BMI Smoking Status 99% 27.12 kg/m2 Never Smoker Vitals History BMI and BSA Data Body Mass Index Body Surface Area  
 27.12 kg/m 2 2.15 m 2 Preferred Pharmacy Pharmacy Name Phone [address] [phone] Your Updated Medication List  
  
   
This list is accurate as of: 18  3:15 PM.  Always use your most recent med list.  
  
  
  
  
 azithromycin 250 mg tablet Commonly known as:  Miguel Alvarezin Take 2 tablets today, then take 1 tablet daily FISH OIL 1,000 mg Cap Generic drug:  omega-3 fatty acids-vitamin e  
 Take 1 Cap by mouth. * imiquimod 5 % cream  
Commonly known as:  Jannice Night Apply a thin layer 3 times/week prior to bedtime and leave on skin for 6-10 hours. Remove with mild soap and water. Continue treatment until there is total clearance of the warts or a maximum duration of therapy of 16 weeks * imiquimod 5 % cream  
Commonly known as:  Jannice Night Apply a thin layer 3 times/week prior to bedtime and leave on skin for 6-10 hours. Continue treatment until there is total clearance  
  
 ketoconazole 2 % topical cream  
Commonly known as:  NIZORAL Apply  to affected area daily. multivitamin tablet Commonly known as:  ONE A DAY Take 1 Tab by mouth daily. nystatin-triamcinolone 100,000-0.1 unit/gram-% ointment Commonly known as:  Yasmin Hard Apply to affected area sparingly twice a week for a maximum of 1 week * Notice: This list has 2 medication(s) that are the same as other medications prescribed for you. Read the directions carefully, and ask your doctor or other care provider to review them with you. Prescriptions Sent to Pharmacy Refills  
 azithromycin (ZITHROMAX) 250 mg tablet 0 Sig: Take 2 tablets today, then take 1 tablet daily Class: Normal  
 Pharmacy: [facility] [address] [facility] [place], [facility] [place] Ph #: [phone]  
 imiquimod (ALDARA) 5 % cream 3 Sig: Apply a thin layer 3 times/week prior to bedtime and leave on skin for 6-10 hours. Continue treatment until there is total clearance Class: Normal  
 Pharmacy: [facility] [address], [address] [place] [address] Ph #: [phone] We Performed the Following DESTRUC BENIGN LESION, UP TO 14 LESIONS [28508 CPT(R)] REFERRAL TO PHYSICAL THERAPY [HUS63 Custom] Follow-up Instructions Return if symptoms worsen or fail to improve.   
  
To-Do List   
 02/05/2018 12:00 PM  
 Appointment with Prasanna Toney, PT, DPT at [facility] [address] ([phone]) Referral Information Referral ID Referred By Referred To  
  
 [id number] [facility] PT [facility]   
   Bryan Rojas, [address] Phone: [phone] Fax: [phone] Visits Status Start Date End Date  
 20 Open 1/24/18 1/24/19 If your referral has a status of pending review or denied, additional information will be sent to support the outcome of this decision. Introducing [facility]! Randee Keller introduces [employer] patient portal. Now you can access parts of your medical record, email your doctor's office, and request medication refills online. 1. In your internet browser, go to [email or web address] 2. Click on the First Time User? Click Here link in the Sign In box. You will see the New Member Sign Up page. 3. Enter your [employer] Access Code exactly as it appears below. You will not need to use this code after youve completed the sign-up process. If you do not sign up before the expiration date, you must request a new code. · [employer] Access Code: [email or web address] Expires: 2/11/2018 11:31 AM 
 
4. Enter the last four digits of your Social Security Number (xxxx) and Date of Birth (mm/dd/yyyy) as indicated and click Submit. You will be taken to the next sign-up page. 5. Create a [employer] ID. This will be your [employer] login ID and cannot be changed, so think of one that is secure and easy to remember. 6. Create a [employer] password. You can change your password at any time. 7. Enter your Password Reset Question and Answer. This can be used at a later time if you forget your password. 8. Enter your e-mail address. You will receive e-mail notification when new information is available in [address] 9. Click Sign Up. You can now view and download portions of your medical record. 10. Click the Download Summary menu link to download a portable copy of your medical information. If you have questions, please visit the Frequently Asked Questions section of the [employer] website. Remember, [employer] is NOT to be used for urgent needs. For medical emergencies, dial 911. Now available from your iPhone and Android! Please provide this summary of care documentation to your next provider. Your primary care clinician is listed as Jose Francisco [place]. If you have any questions after today's visit, please call [phone].

## 2018-01-24 NOTE — PROGRESS NOTES
Chief Complaint   Patient presents with    Cold Symptoms     he is a 33y.o. year old male who presents for evaluation of Cold Symptoms  congestion for 1 weeks. no nausea and no vomiting . No fever. Over-the-counter remedies including nyuqil/dayquil   has been used with poor relief of symptoms. Hx Asthma:  no  Smoker:  no  Contacts with similar infections: yes   Recent travel:yes   Sputum Description: scant and clear      Reviewed and agree with Nurse Note and duplicated in this note. Reviewed PmHx, RxHx, FmHx, SocHx, AllgHx and updated and dated in the chart.     Family History   Problem Relation Age of Onset    No Known Problems Mother     No Known Problems Father        Past Medical History:   Diagnosis Date    Nausea & vomiting     Shoulder pain       Social History     Social History    Marital status: SINGLE     Spouse name: N/A    Number of children: N/A    Years of education: N/A     Social History Main Topics    Smoking status: Never Smoker    Smokeless tobacco: Never Used    Alcohol use Yes      Comment: 2-3 drinks a month    Drug use: No    Sexual activity: Not Asked     Other Topics Concern    None     Social History Narrative        Review of Systems - negative except as listed above      Objective:     Vitals:    01/24/18 1321   BP: 132/77   Pulse: 62   Resp: 16   Temp: 98 °F (36.7 °C)   TempSrc: Oral   SpO2: 99%   Weight: 200 lb (90.7 kg)   Height: 6' (1.829 m)       Physical Examination: General appearance - alert, well appearing, and in no distress  Eyes - pupils equal and reactive, extraocular eye movements intact  Ears - bilateral TM's and external ear canals normal  Nose - mucosal congestion and mucosal erythema  Mouth - mucous membranes moist, pharynx normal without lesions  Neck - supple, no significant adenopathy  Chest - clear to auscultation, no wheezes, rales or rhonchi, symmetric air entry  Heart - normal rate, regular rhythm, normal S1, S2, no murmurs, rubs, clicks or gallops  Abdomen - soft, nontender, nondistended, no masses or organomegaly   Male - no penile lesions or discharge, no testicular masses or tenderness, no hernias, multiple warts on penis  Musculoskeletal - no joint tenderness, deformity or swellingno joint tenderness, deformity or swelling  Extremities - peripheral pulses normal, no pedal edema, no clubbing or cyanosis  Skin - normal coloration and turgor, no rashes, no suspicious skin lesions noted    Assessment/ Plan:   Diagnoses and all orders for this visit:    1. Maxillary sinusitis, unspecified chronicity    2. Acute pain of right shoulder  -     REFERRAL TO PHYSICAL THERAPY    3. Genital warts    Other orders  -     azithromycin (ZITHROMAX) 250 mg tablet; Take 2 tablets today, then take 1 tablet daily  -     imiquimod (ALDARA) 5 % cream; Apply a thin layer 3 times/week prior to bedtime and leave on skin for 6-10 hours. Continue treatment until there is total clearance     Follow-up Disposition: Not on File  Adults: For nasal congestion, cough and cold/flu symptoms I advised:    - Seek medical care if symptoms become more severe or if you develop      chest pain, shortness of breath, confusion.    - Contact us if your symptoms fail to improve after 7-10 days   - Rest as much as possible and stay home from work/school at least 24 hours                  after last fever              - Wash hand frequently and cough/sneeze into your sleeve to help prevent       infection of others   - Drink plenty of fluids   - Ibuprofen (Advil, Motrin) 400-800mg every 6 hours or                  Aleve 220 mg 1-2 pills every 8 hours for fever, headache, pain   - Tylenol extra strength 500 mg every 6 hours for pain, headache, fever   - Nasal saline rinses 2-3 times daily for nasal congestion   - Mucinex 1200 mg twice daily or Guaifenesin 400 mg every 4 hours for chest       congestion              - Robitussin DM or Delsym for cough(suppress cough and thin mucus.  )   - Cepacol throat lozenges and saline gargles (1 tsp salt in 8 oz water) for sore       throat   - Tea with honey for cough (buckwheat honey preferred)              - Benadryl (diphenhydramine) 50 mg at night for nasal congestion/allergies   - Pseudoephedrine 12-hour tablets twice daily for nasal and inner ear       -Ask your pharmacist (this is kept behind the counter)     -If you have high blood pressure or heart disease, use this        medication with caution (ask your doctor), alternative coricidin   - Afrin (oxymetazoline) nasal spray 2 sprays in each nostril twice daily for severe      congestion.       -Do not use this medication for more than 3 days as it may cause         \"rebound congestion\". -If you have high blood pressure or heart disease, use this medication       with caution (ask your doctor)      Children:   Sit in bathroom with hot shower running for steam.    Nasal saline rinses and Neti pot  In general for viral respiratory infection -  Aim for drainage/increased airflow  Increase fluids - Pedialyte popsicles, Gatorade mixed with 50% water      1) Remember to stay active and/or exercise regularly (I suggest 30-45 minutes daily)   2) For reliable dietary information, go to www. EATRIGHT.org. You may wish to consider seeing the nutritionist at [facility] [phone], also consider the [address] I have discussed the diagnosis with the patient and the intended plan as seen in the above orders. The patient has received an after-visit summary and questions were answered concerning future plans. Medication Side Effects and Warnings were discussed with patient: yes  Patient Labs were reviewed and or requested: yes  Patient Past Records were reviewed and or requested  yes  I have discussed the diagnosis with the patient and the intended plan as seen in the above orders. The patient has received an after-visit summary and questions were answered concerning future plans.      Pt agrees to call or return to clinic and/or go to closest ER with any worsening of symptoms. This may include, but not limited to increased fever (>100.4) with NSAIDS or Tylenol, increased edema, confusion, rash, worsening of presenting symptoms. Subjective:    Alexander Wharton is a 33 y.o. male who presents for lesion removal. We have discussed this procedure, including option of not performing surgery, technique of surgery and potential for scarring at an earlier visit. Oubjective:   Patient appears well. Visit Vitals    /77 (BP 1 Location: Left arm, BP Patient Position: Sitting)    Pulse 62    Temp 98 °F (36.7 °C) (Oral)    Resp 16    Ht 6' (1.829 m)    Wt 200 lb (90.7 kg)    SpO2 99%    BMI 27.12 kg/m2     Skin: warts on penis    Assessment:   warts    Procedure Note:   cryotherapy with liquid nitrogen was performed. Antibiotic dressing is applied, and wound care instructions provided. Be alert for any signs of cutaneous infection. The procedure was well tolerated without complications. Follow up: the patient may return prn.

## 2018-02-05 ENCOUNTER — HOSPITAL ENCOUNTER (OUTPATIENT)
Dept: PHYSICAL THERAPY | Age: 33
Discharge: HOME | End: 2018-02-05
Payer: COMMERCIAL

## 2018-02-05 PROCEDURE — 97140 MANUAL THERAPY 1/> REGIONS: CPT | Performed by: PHYSICAL THERAPIST

## 2018-02-05 PROCEDURE — 97161 PT EVAL LOW COMPLEX 20 MIN: CPT | Performed by: PHYSICAL THERAPIST

## 2018-02-05 PROCEDURE — 97110 THERAPEUTIC EXERCISES: CPT | Performed by: PHYSICAL THERAPIST

## 2018-02-05 NOTE — PROGRESS NOTES
PT INITIAL EVALUATION NOTE 2-15    Patient Name: Abdiel Urbina  Date:2018  : 1985  [x]  Patient  Verified  Payor: Monica Chauhan / Plan: [address] [place] [address] / Product Type: PPO /    In time:1200p  Out time:110p  Total Treatment Time (min): 70  Visit #: 1     Treatment Area: Right shoulder pain [M25.511]    SUBJECTIVE  Pain Level (0-10 scale): 1/10  Any medication changes, allergies to medications, adverse drug reactions, diagnosis change, or new procedure performed?: [] No    [x] Yes (see summary sheet for update)  Subjective:     2018 reports onset of right shoulder pain and weakness following testing for [employer] participation that required repeated shoulder abduction through full available range of motion. Pain has reduced some but still reproducing pain with overhead activity and reaching behind his back. Wants to focus on strength and stability in the right shoulder and Right knee. Right knee was fine with 5.5 mile ruck run without pain. Feels his right shoulder mobility is still limiting his performance after doing a test requiring shoulder abduction. He is able to sleep on the right side. Right hand dominant. Needs to improve tolerance to push-ups for testing for     Right shoulder surgery 2017 (rotator cuff repair and debridement, labral debridement, subacromial decompression and distal clavicle resection) with history of 4 shoulder dislocation/subluxation (anterior and posterior directions). 2016 Right knee ACL Reconstruction        OBJECTIVE  Posture:  Slouched sitting posture  Other Observations:  FMS Deep Squat: 2, In-Line Lunge R2  L2,  Jewel Step R2  L1, SLR R3  L3, Rotational Stability R2  L2, Push-up 3,  Shoulder mobility R1   L1   Functional  and Pinch:  --  Palpation: tenderness at right subacromial space    Right Shoulder ROM:  AROM   PROM   Flexion   155   155 P!    Extension  80   85   Abduction  155   155 P!   Adduction  40   45   IR   Hand to T9  80   ER   Hand to T4  80 without G-H stabilization 88 with G-H stabilization    Joint Mobility Assessment: Glenohumeral: inferior hypomobility      Acromioclavicular: normal      Sternoclavicular: normal    Flexibility: pec major/minor stiffness    UPPER QUARTER   MUSCLE STRENGTH  KEY       R  L  0 - No Contraction   Flexion  5  5  1 - Trace    Extension 5  5  2 - Poor    Abduction 5  5  3 - Fair     IR  5  5  4 - Good    ER  5  5  5 - Normal       Neurological: Reflexes / Sensations: normal  Special Tests: Neer Impingement: positive  Peace-Hugo: positive      Scapular Reposition: positiv  Shoulder Abduction: negative    Todd: negative   Apprehension: positive              15 min Therapeutic Exercise:  [x] See flow sheet :   Rationale: increase ROM, increase strength, improve coordination, improve balance and increase proprioception to improve the patients ability to reaching overhead    15 min Manual Therapy:  Passive right shoulder stretching flexion and ER with G-H A-P stabilization force   Rationale: decrease pain, increase ROM and increase tissue extensibility  to improve the patients ability to reaching overhead            With   [x] TE   [] TA   [] neuro   [] other: Patient Education: [x] Review HEP    [] Progressed/Changed HEP based on:   [] positioning   [] body mechanics   [] transfers   [] heat/ice application    [] other:        Other Objective/Functional Measures: FOTO Functional Measure: 100/100    Pain Level (0-10 scale) post treatment: 0/10      ASSESSMENT:      [x]  See Plan of Care      Emil Cho PT, BHAVIN 2/5/2018  12:09 PM

## 2018-02-05 NOTE — PROGRESS NOTES
[facility]  [address] [address]  Phone: [phone]  Fax: [phone]    Plan of Care/Statement of Necessity for Physical Therapy Services  2-15    Patient name: Tammy Lazo  : 1985  Provider#: [id number]  Referral source: Puneet Godinez MD      Medical/Treatment Diagnosis: Right shoulder pain [M25.511]     Prior Hospitalization: see medical history     Comorbidities: Right shoulder surgery  Prior Level of Function: complete 20 minutes of exercise at least 3 times a week  Medications: Verified on Patient Summary List    Start of Care: 2018      Onset Date: 2018       The Plan of Care and following information is based on the information from the initial evaluation. Assessment/ wesley information: 33 y.o. Male presenting with right shoulder fatigue/pain with overhead and closed chain activity as he is attempting to increase his training and activity in preparation for participation in the [employer]. History of Right shoulder surgery 2017 (rotator cuff repair and debridement, labral debridement, subacromial decompression and distal clavicle resection). Treatment focus will on restoring right G-H mobility both passively and actively as well as correcting movement impairments of the shoulder, thoracic spine and core/pelvis region.     Evaluation Complexity History MEDIUM  Complexity : 1-2 comorbidities / personal factors will impact the outcome/ POC ; Examination HIGH Complexity : 4+ Standardized tests and measures addressing body structure, function, activity limitation and / or participation in recreation  ;Presentation LOW Complexity : Stable, uncomplicated  ;Clinical Decision Making LOW Complexity : FOTO score of   Overall Complexity Rating: LOW     Problem List: pain affecting function, decrease ROM, decrease strength, decrease ADL/ functional abilitiies, decrease activity tolerance and decrease flexibility/ joint mobility   Treatment Plan may include any combination of the following: Therapeutic exercise, Therapeutic activities, Neuromuscular re-education, Physical agent/modality, Gait/balance training, Manual therapy, Patient education and Self Care training  Patient / Family readiness to learn indicated by: asking questions and trying to perform skills  Persons(s) to be included in education: patient (P)  Barriers to Learning/Limitations: None  Patient Goal (s): to be 100%  Patient Self Reported Health Status: good  Rehabilitation Potential: good    Long Term Goals: To be accomplished in 4-6 weeks:  1) Pt will be independent with HEP  2) Pt will be able to perform pushups without pain  3) Pt will be able to reach overhead without pain    Frequency / Duration: Patient to be seen 2 times per week for 2-3 weeks then reduce to 1 time per week for 2-3 weeks. Patient/ Caregiver education and instruction: activity modification and exercises    [x]  Plan of care has been reviewed with NALINI Rodas PT, DPT 2/5/2018 5:15 PM    ________________________________________________________________________    I certify that the above Therapy Services are being furnished while the patient is under my care. I agree with the treatment plan and certify that this therapy is necessary.     [de-identified] Signature:____________________  Date:____________Time: _________

## 2018-02-09 ENCOUNTER — HOSPITAL ENCOUNTER (OUTPATIENT)
Dept: PHYSICAL THERAPY | Age: 33
Discharge: HOME | End: 2018-02-09
Payer: COMMERCIAL

## 2018-02-09 PROCEDURE — 97140 MANUAL THERAPY 1/> REGIONS: CPT | Performed by: PHYSICAL THERAPIST

## 2018-02-09 PROCEDURE — 97110 THERAPEUTIC EXERCISES: CPT | Performed by: PHYSICAL THERAPIST

## 2018-02-09 PROCEDURE — 97016 VASOPNEUMATIC DEVICE THERAPY: CPT | Performed by: PHYSICAL THERAPIST

## 2018-02-09 NOTE — PROGRESS NOTES
PT DAILY TREATMENT NOTE - [facility] -15    Patient Name: Yoli Reynoso  Date:2018  : 1985  [x]  Patient  Verified  Payor: BAYLEE Isabel / Plan: [address] [place] [address] / Product Type: PPO /    In time:800a  Out time:930a  Total Treatment Time (min): 90  Total Timed Codes (min): 75  1:1 Treatment Time ( only): --   Visit #: 2     Treatment Area: Right shoulder pain [M25.511]    SUBJECTIVE  Pain Level (0-10 scale): 1/10  Any medication changes, allergies to medications, adverse drug reactions, diagnosis change, or new procedure performed?: [x] No    [] Yes (see summary sheet for update)  Subjective functional status/changes:   [] No changes reported  Pt states that he could notice improvement in the shoulder ER after stretching    OBJECTIVE    Modality rationale: decrease inflammation and decrease pain to improve the patients ability to reaching overhead   Min Type Additional Details    [] Estim: []Att   []Unatt        []TENS instruct                  []IFC  []Premod   []NMES                     []Other:  []w/US   []w/ice   []w/heat  Position:  Location:    []  Traction: [] Cervical       []Lumbar                       [] Prone          []Supine                       []Intermittent   []Continuous Lbs:  [] before manual  [] after manual  []w/heat    []  Ultrasound: []Continuous   [] Pulsed at:                           []1MHz   []3MHz Location:  W/cm2:    [] Paraffin         Location:   []w/heat    []  Ice     []  Heat  []  Ice massage Position:  Location:    []  Laser  []  Other: Position:  Location:   15 [x]  Vasopneumatic Device Pressure:       [] lo [x] med [] hi   Temperature: 54F      [x] Skin assessment post-treatment:  [x]intact []redness- no adverse reaction    []redness  adverse reaction:     60 min Therapeutic Exercise:  [x] See flow sheet :   Rationale: increase ROM, increase strength, improve coordination, improve balance and increase proprioception to improve the patients ability to reaching overhead      15 min Manual Therapy: Passive shoulder ER stretching with A-P G-H stabilization, shoulder flexion and IR    Rationale: decrease pain, increase ROM and increase tissue extensibility to improve the patients ability to reaching overhead            With   [x] TE   [] TA   [] neuro   [] other: Patient Education: [x] Review HEP    [] Progressed/Changed HEP based on:   [] positioning   [] body mechanics   [] transfers   [] heat/ice application    [] other:      Other Objective/Functional Measures: PROM Shoulder flexion 160 ER 95     Pain Level (0-10 scale) post treatment: 0/10    ASSESSMENT/Changes in Function:   Pushed end range flexion, ER and IR. Ensured performance with active movement stretching technique. Patient will continue to benefit from skilled PT services to modify and progress therapeutic interventions, address functional mobility deficits, address ROM deficits, address strength deficits, analyze and address soft tissue restrictions, analyze and cue movement patterns, analyze and modify body mechanics/ergonomics and assess and modify postural abnormalities to attain remaining goals. []  See Plan of Care  []  See progress note/recertification  []  See Discharge Summary         Progress towards goals / Updated goals:  Long Term Goals:  To be accomplished in 4-6 weeks:  1) Pt will be independent with HEP  2) Pt will be able to perform pushups without pain  3) Pt will be able to reach overhead without pain       PLAN  [x]  Upgrade activities as tolerated     [x]  Continue plan of care  []  Update interventions per flow sheet       []  Discharge due to:_  []  Other:_      Faye Burrell, PT, DPT 2/9/2018  8:23 AM

## 2018-02-13 ENCOUNTER — HOSPITAL ENCOUNTER (OUTPATIENT)
Dept: PHYSICAL THERAPY | Age: 33
Discharge: HOME | End: 2018-02-13
Payer: COMMERCIAL

## 2018-02-13 PROCEDURE — 97140 MANUAL THERAPY 1/> REGIONS: CPT | Performed by: PHYSICAL THERAPIST

## 2018-02-13 PROCEDURE — 97110 THERAPEUTIC EXERCISES: CPT | Performed by: PHYSICAL THERAPIST

## 2018-02-13 PROCEDURE — 97112 NEUROMUSCULAR REEDUCATION: CPT | Performed by: PHYSICAL THERAPIST

## 2018-02-13 NOTE — PROGRESS NOTES
PT DAILY TREATMENT NOTE - [facility] 2-15    Patient Name: Cecy Alvarado  Date:2018  : 1985  [x]  Patient  Verified  Payor: Yadira Chas / Plan: [address] [place] [address] / Product Type: PPO /    In time:1010a  Out time:1120a  Total Treatment Time (min): 70  Total Timed Codes (min): 55  1:1 Treatment Time ( only): --   Visit #: 3     Treatment Area: Right shoulder pain [M25.511]    SUBJECTIVE  Pain Level (0-10 scale): 0/10  Any medication changes, allergies to medications, adverse drug reactions, diagnosis change, or new procedure performed?: [x] No    [] Yes (see summary sheet for update)  Subjective functional status/changes:   [] No changes reported  Pt states that he feels muscle soreness but not pain in joint.     OBJECTIVE  Modality rationale: decrease inflammation and decrease pain to improve the patients ability to reaching overhead   Min Type Additional Details     [] Estim: []Att   []Unatt        []TENS instruct                  []IFC  []Premod   []NMES                     []Other:  []w/US   []w/ice   []w/heat  Position:  Location:     []  Traction: [] Cervical       []Lumbar                       [] Prone          []Supine                       []Intermittent   []Continuous Lbs:  [] before manual  [] after manual  []w/heat     []  Ultrasound: []Continuous   [] Pulsed at:                           []1MHz   []3MHz Location:  W/cm2:     [] Paraffin      Location:   []w/heat     []  Ice     []  Heat  []  Ice massage Position:  Location:     []  Laser  []  Other: Position:  Location:   15 [x]  Vasopneumatic Device Pressure:       [] lo [x] med [] hi   Temperature: 54F       [x] Skin assessment post-treatment:  [x]intact []redness- no adverse reaction    []redness  adverse reaction:      40 min Therapeutic Exercise:  [x] See flow sheet :   Rationale: increase ROM, increase strength, improve coordination, improve balance and increase proprioception to improve the patients ability to reaching overhead        15 min Manual Therapy: Passive shoulder ER stretching with A-P G-H stabilization, shoulder flexion and IR    Rationale: decrease pain, increase ROM and increase tissue extensibility to improve the patients ability to reaching overhead         With   [x] TE   [] TA   [] neuro   [] other: Patient Education: [x] Review HEP    [] Progressed/Changed HEP based on:   [] positioning   [] body mechanics   [] transfers   [] heat/ice application    [] other:       Other Objective/Functional Measures: --                        Pain Level (0-10 scale) post treatment: 0/10     ASSESSMENT/Changes in Function:   Pushed end range flexion, ER and IR.     Patient will continue to benefit from skilled PT services to modify and progress therapeutic interventions, address functional mobility deficits, address ROM deficits, address strength deficits, analyze and address soft tissue restrictions, analyze and cue movement patterns, analyze and modify body mechanics/ergonomics and assess and modify postural abnormalities to attain remaining goals.      []  See Plan of Care  []  See progress note/recertification  []  See Discharge Summary      Progress towards goals / Updated goals:  Long Term Goals: To be accomplished in 4-6 weeks:  1) Pt will be independent with HEP  2) Pt will be able to perform pushups without pain  3) Pt will be able to reach overhead without pain        PLAN  [x]  Upgrade activities as tolerated     [x]  Continue plan of care  []  Update interventions per flow sheet       []  Discharge due to:_  []  Other:_      Dominguez Lr, PT, DPT 2/13/2018  4:18 PM

## 2018-02-16 ENCOUNTER — HOSPITAL ENCOUNTER (OUTPATIENT)
Dept: PHYSICAL THERAPY | Age: 33
Discharge: HOME | End: 2018-02-16
Payer: COMMERCIAL

## 2018-02-16 PROCEDURE — 97110 THERAPEUTIC EXERCISES: CPT | Performed by: PHYSICAL THERAPIST

## 2018-02-16 PROCEDURE — 97140 MANUAL THERAPY 1/> REGIONS: CPT | Performed by: PHYSICAL THERAPIST

## 2018-02-16 NOTE — PROGRESS NOTES
PT DAILY TREATMENT NOTE - [facility] 2-15    Patient Name: Rosalina Craig  Date:2018  : 1985  [x]  Patient  Verified  Payor: Melissa Clifford / Plan: [address] [place] [address] / Product Type: PPO /    In time:1145a  Out time:1250p  Total Treatment Time (min): 65  Total Timed Codes (min): 65  1:1 Treatment Time ( only): --   Visit #: 4     Treatment Area: Right shoulder pain [M25.511]    SUBJECTIVE  Pain Level (0-10 scale): 0/10  Any medication changes, allergies to medications, adverse drug reactions, diagnosis change, or new procedure performed?: [x] No    [] Yes (see summary sheet for update)  Subjective functional status/changes:   [] No changes reported  Pt states he is having difficulty with shoulder mobility exercises due to stiffness.     OBJECTIVE          Modality rationale: decrease inflammation and decrease pain to improve the patients ability to reaching overhead   Min Type Additional Details      [] Estim: []Att   []Unatt        []TENS instruct                  []IFC  []Premod   []NMES                     []Other:  []w/US   []w/ice   []w/heat  Position:  Location:      []  Traction: [] Cervical       []Lumbar                       [] Prone          []Supine                       []Intermittent   []Continuous Lbs:  [] before manual  [] after manual  []w/heat      []  Ultrasound: []Continuous   [] Pulsed at:                           []1MHz   []3MHz Location:  W/cm2:      [] Paraffin      Location:   []w/heat   To go   [x]  Ice     []  Heat  []  Ice massage Position:  Location:      []  Laser  []  Other: Position:  Location:    []  Vasopneumatic Device Pressure:       [] lo [] med [] hi   Temperature: 54F        [x] Skin assessment post-treatment:  [x]intact []redness- no adverse reaction    []redness  adverse reaction:       50 min Therapeutic Exercise:  [x] See flow sheet :   Rationale: increase ROM, increase strength, improve coordination, improve balance and increase proprioception to improve the patients ability to reaching overhead          15 min Manual Therapy: Passive shoulder ER stretching with A-P G-H stabilization, shoulder flexion and IR    Rationale: decrease pain, increase ROM and increase tissue extensibility to improve the patients ability to reaching overhead          With   [x] TE   [] TA   [] neuro   [] other: Patient Education: [x] Review HEP    [] Progressed/Changed HEP based on:   [] positioning   [] body mechanics   [] transfers   [] heat/ice application    [] other:        Other Objective/Functional Measures: --                         Pain Level (0-10 scale) post treatment: 0/10      ASSESSMENT/Changes in Function:   Pushed end range flexion, ER and IR. corrected shoulder mobility training performance.   Patient will continue to benefit from skilled PT services to modify and progress therapeutic interventions, address functional mobility deficits, address ROM deficits, address strength deficits, analyze and address soft tissue restrictions, analyze and cue movement patterns, analyze and modify body mechanics/ergonomics and assess and modify postural abnormalities to attain remaining goals.      []  See Plan of Care  []  See progress note/recertification  []  See Discharge Summary      Progress towards goals / Updated goals:  Long Term Goals: To be accomplished in 4-6 weeks:  1) Pt will be independent with HEP  2) Pt will be able to perform pushups without pain  3) Pt will be able to reach overhead without pain          PLAN  [x]  Upgrade activities as tolerated     [x]  Continue plan of care  []  Update interventions per flow sheet       []  Discharge due to:_  []  Other:_        Geo Carias, PT, DPT 2/16/2018  2:43 PM

## 2018-02-23 ENCOUNTER — HOSPITAL ENCOUNTER (OUTPATIENT)
Dept: PHYSICAL THERAPY | Age: 33
Discharge: HOME | End: 2018-02-23
Payer: COMMERCIAL

## 2018-02-23 PROCEDURE — 97016 VASOPNEUMATIC DEVICE THERAPY: CPT | Performed by: PHYSICAL THERAPIST

## 2018-02-23 PROCEDURE — 97140 MANUAL THERAPY 1/> REGIONS: CPT | Performed by: PHYSICAL THERAPIST

## 2018-02-23 PROCEDURE — 97110 THERAPEUTIC EXERCISES: CPT | Performed by: PHYSICAL THERAPIST

## 2018-02-23 NOTE — PROGRESS NOTES
PT DAILY TREATMENT NOTE - [facility] 2-15    Patient Name: Stefan Mitchell  Date:2018  : 1985  [x]  Patient  Verified  Payor: Ammon Ferrer / Plan: [address] [place] [address] / Product Type: PPO /    In time:810a  Out time:930p  Total Treatment Time (min): 80  Total Timed Codes (min): 65  1:1 Treatment Time ( only): --   Visit #: 5     Treatment Area: Right shoulder pain [M25.511]    SUBJECTIVE  Pain Level (0-10 scale): 0/10  Any medication changes, allergies to medications, adverse drug reactions, diagnosis change, or new procedure performed?: [x] No    [] Yes (see summary sheet for update)  Subjective functional status/changes:   [] No changes reported  Pt states that he is not noticing any increased pain and his flexibility is improving.     OBJECTIVE        Modality rationale: decrease inflammation and decrease pain to improve the patients ability to reaching overhead   Min Type Additional Details      [] Estim: []Att   []Unatt        []TENS instruct                  []IFC  []Premod   []NMES                     []Other:  []w/US   []w/ice   []w/heat  Position:  Location:      []  Traction: [] Cervical       []Lumbar                       [] Prone          []Supine                       []Intermittent   []Continuous Lbs:  [] before manual  [] after manual  []w/heat      []  Ultrasound: []Continuous   [] Pulsed at:                           []1MHz   []3MHz Location:  W/cm2:      [] Paraffin      Location:   []w/heat      []  Ice     []  Heat  []  Ice massage Position:  Location:      []  Laser  []  Other: Position:  Location:   15 [x]  Vasopneumatic Device Pressure:       [] lo [x] med [] hi   Temperature: 54F        [x] Skin assessment post-treatment:  [x]intact []redness- no adverse reaction    []redness  adverse reaction:       35 min Therapeutic Exercise:  [x] See flow sheet :   Rationale: increase ROM, increase strength, improve coordination, improve balance and increase proprioception to improve the patients ability to reaching overhead          30 min Manual Therapy: Passive shoulder ER stretching with A-P G-H stabilization, shoulder flexion and IR    Rationale: decrease pain, increase ROM and increase tissue extensibility to improve the patients ability to reaching overhead          With   [x] TE   [] TA   [] neuro   [] other: Patient Education: [x] Review HEP    [] Progressed/Changed HEP based on:   [] positioning   [] body mechanics   [] transfers   [] heat/ice application    [] other:        Other Objective/Functional Measures: --                         Pain Level (0-10 scale) post treatment: 0/10      ASSESSMENT/Changes in Function:   Pushed end range flexion, ER and IR. Additional focus on shoulder ER+ABD > 90 degrees today in supine. Added CKC and open chain exercise today.    Patient will continue to benefit from skilled PT services to modify and progress therapeutic interventions, address functional mobility deficits, address ROM deficits, address strength deficits, analyze and address soft tissue restrictions, analyze and cue movement patterns, analyze and modify body mechanics/ergonomics and assess and modify postural abnormalities to attain remaining goals.      []  See Plan of Care  []  See progress note/recertification  []  See Discharge Summary      Progress towards goals / Updated goals:  Long Term Goals: To be accomplished in 4-6 weeks:  1) Pt will be independent with HEP Progressing  2) Pt will be able to perform pushups without pain  3) Pt will be able to reach overhead without pain          PLAN  [x]  Upgrade activities as tolerated     [x]  Continue plan of care  []  Update interventions per flow sheet       []  Discharge due to:_  []  Other:_      Shubham Dennison, PT, DPT 2/23/2018  1:27 PM

## 2018-02-26 ENCOUNTER — HOSPITAL ENCOUNTER (OUTPATIENT)
Dept: PHYSICAL THERAPY | Age: 33
Discharge: HOME | End: 2018-02-26
Payer: COMMERCIAL

## 2018-02-26 PROCEDURE — 97110 THERAPEUTIC EXERCISES: CPT | Performed by: PHYSICAL THERAPIST

## 2018-02-26 PROCEDURE — 97016 VASOPNEUMATIC DEVICE THERAPY: CPT | Performed by: PHYSICAL THERAPIST

## 2018-02-26 PROCEDURE — 97140 MANUAL THERAPY 1/> REGIONS: CPT | Performed by: PHYSICAL THERAPIST

## 2018-02-26 NOTE — PROGRESS NOTES
PT DAILY TREATMENT NOTE - [facility] 2-15    Patient Name: Dereck Resendiz  Date:2018  : 1985  [x]  Patient  Verified  Payor: Henna Mendez / Plan: [address] [place] [address] / Product Type: PPO /    In time:200p  Out time:330p  Total Treatment Time (min): 90  Total Timed Codes (min): 75  1:1 Treatment Time ( only): --   Visit #: 6     Treatment Area: Right shoulder pain [M25.511]    SUBJECTIVE  Pain Level (0-10 scale): 0/10  Any medication changes, allergies to medications, adverse drug reactions, diagnosis change, or new procedure performed?: [x] No    [] Yes (see summary sheet for update)  Subjective functional status/changes:   [] No changes reported  Pt reports that he is not noticing residual soreness after each session.     OBJECTIVE           Modality rationale: decrease inflammation and decrease pain to improve the patients ability to reaching overhead   Min Type Additional Details      [] Estim: []Att   []Unatt        []TENS instruct                  []IFC  []Premod   []NMES                     []Other:  []w/US   []w/ice   []w/heat  Position:  Location:      []  Traction: [] Cervical       []Lumbar                       [] Prone          []Supine                       []Intermittent   []Continuous Lbs:  [] before manual  [] after manual  []w/heat      []  Ultrasound: []Continuous   [] Pulsed at:                           []1MHz   []3MHz Location:  W/cm2:      [] Paraffin      Location:   []w/heat      []  Ice     []  Heat  []  Ice massage Position:  Location:      []  Laser  []  Other: Position:  Location:   15 [x]  Vasopneumatic Device Pressure:       [] lo [x] med [] hi   Temperature: 54F        [x] Skin assessment post-treatment:  [x]intact []redness- no adverse reaction    []redness  adverse reaction:       35 min Therapeutic Exercise:  [x] See flow sheet :   Rationale: increase ROM, increase strength, improve coordination, improve balance and increase proprioception to improve the patients ability to reaching overhead          30 min Manual Therapy: Passive shoulder ER stretching with A-P G-H stabilization, shoulder flexion and IR    Rationale: decrease pain, increase ROM and increase tissue extensibility to improve the patients ability to reaching overhead          With   [x] TE   [] TA   [] neuro   [] other: Patient Education: [x] Review HEP    [] Progressed/Changed HEP based on:   [] positioning   [] body mechanics   [] transfers   [] heat/ice application    [] other:        Other Objective/Functional Measures: --                         Pain Level (0-10 scale) post treatment: 0/10      ASSESSMENT/Changes in Function:   Pushed end range flexion, ER and IR. Additional focus on shoulder ER+ABD > 90 degrees today in supine and with trunk rotation to the right. He did report residual soreness pushing into end range ER+ABD by end of session. Patient will continue to benefit from skilled PT services to modify and progress therapeutic interventions, address functional mobility deficits, address ROM deficits, address strength deficits, analyze and address soft tissue restrictions, analyze and cue movement patterns, analyze and modify body mechanics/ergonomics and assess and modify postural abnormalities to attain remaining goals.      []  See Plan of Care  []  See progress note/recertification  []  See Discharge Summary      Progress towards goals / Updated goals:  Long Term Goals: To be accomplished in 4-6 weeks:  1) Pt will be independent with HEP Progressing  2) Pt will be able to perform pushups without pain  3) Pt will be able to reach overhead without pain          PLAN  [x]  Upgrade activities as tolerated     [x]  Continue plan of care  []  Update interventions per flow sheet       []  Discharge due to:_  []  Other:_      Shaun Lenz, PT, DPT 2/26/2018  5:04 PM

## 2018-03-02 ENCOUNTER — HOSPITAL ENCOUNTER (OUTPATIENT)
Dept: PHYSICAL THERAPY | Age: 33
Discharge: HOME | End: 2018-03-02
Payer: COMMERCIAL

## 2018-03-02 PROCEDURE — 97140 MANUAL THERAPY 1/> REGIONS: CPT | Performed by: PHYSICAL THERAPIST

## 2018-03-02 PROCEDURE — 97110 THERAPEUTIC EXERCISES: CPT | Performed by: PHYSICAL THERAPIST

## 2018-03-02 NOTE — PROGRESS NOTES
PT DAILY TREATMENT NOTE - [facility] 2-15    Patient Name: Angelika Obrien  Date:3/2/2018  : 1985  [x]  Patient  Verified  Payor: Jesse Sales / Plan: [address] [place] [address] / Product Type: PPO /    In time:1000a  Out time:1130a  Total Treatment Time (min): 90  Total Timed Codes (min): 90  1:1 Treatment Time ( only): --   Visit #: 7     Treatment Area: [facility]    SUBJECTIVE  Pain Level (0-10 scale): 0/10  Any medication changes, allergies to medications, adverse drug reactions, diagnosis change, or new procedure performed?: [x] No    [] Yes (see summary sheet for update)  Subjective functional status/changes:   [] No changes reported  Pt states that he was pretty sore with reaching overhead after last session.     OBJECTIVE                Modality rationale: decrease inflammation and decrease pain to improve the patients ability to reaching overhead   Min Type Additional Details      [] Estim: []Att   []Unatt        []TENS instruct                  []IFC  []Premod   []NMES                     []Other:  []w/US   []w/ice   []w/heat  Position:  Location:      []  Traction: [] Cervical       []Lumbar                       [] Prone          []Supine                       []Intermittent   []Continuous Lbs:  [] before manual  [] after manual  []w/heat      []  Ultrasound: []Continuous   [] Pulsed at:                           []1MHz   []3MHz Location:  W/cm2:      [] Paraffin      Location:   []w/heat     to go [x]  Ice     []  Heat  []  Ice massage Position:  Location:      []  Laser  []  Other: Position:  Location:    []  Vasopneumatic Device Pressure:       [] lo [x] med [] hi   Temperature: 54F        [x] Skin assessment post-treatment:  [x]intact []redness- no adverse reaction    []redness  adverse reaction:       45 min Therapeutic Exercise:  [x] See flow sheet :   Rationale: increase ROM, increase strength, improve coordination, improve balance and increase proprioception to improve the patients ability to reaching overhead          30 min Manual Therapy: Passive shoulder ER stretching with A-P G-H stabilization, shoulder flexion and IR    Rationale: decrease pain, increase ROM and increase tissue extensibility to improve the patients ability to reaching overhead          With   [x] TE   [] TA   [] neuro   [] other: Patient Education: [x] Review HEP    [] Progressed/Changed HEP based on:   [] positioning   [] body mechanics   [] transfers   [] heat/ice application    [] other:        Other Objective/Functional Measures: --                         Pain Level (0-10 scale) post treatment: 0/10      ASSESSMENT/Changes in Function:   Pushed end range flexion, ER and IR.rotation to the right.  Patient will continue to benefit from skilled PT services to modify and progress therapeutic interventions, address functional mobility deficits, address ROM deficits, address strength deficits, analyze and address soft tissue restrictions, analyze and cue movement patterns, analyze and modify body mechanics/ergonomics and assess and modify postural abnormalities to attain remaining goals.      []  See Plan of Care  []  See progress note/recertification  []  See Discharge Summary      Progress towards goals / Updated goals:  Long Term Goals: To be accomplished in 4-6 weeks:  1) Pt will be independent with HEP Progressing  2) Pt will be able to perform pushups without pain  3) Pt will be able to reach overhead without pain          PLAN  [x]  Upgrade activities as tolerated     [x]  Continue plan of care  []  Update interventions per flow sheet       []  Discharge due to:_  []  Other:_        Geneva Zamora, PT, DPT 3/2/2018  11:36 AM

## 2018-03-09 ENCOUNTER — HOSPITAL ENCOUNTER (OUTPATIENT)
Dept: PHYSICAL THERAPY | Age: 33
Discharge: HOME | End: 2018-03-09
Payer: COMMERCIAL

## 2018-03-09 PROCEDURE — 97140 MANUAL THERAPY 1/> REGIONS: CPT | Performed by: PHYSICAL THERAPIST

## 2018-03-09 PROCEDURE — 97110 THERAPEUTIC EXERCISES: CPT | Performed by: PHYSICAL THERAPIST

## 2018-03-09 PROCEDURE — 97530 THERAPEUTIC ACTIVITIES: CPT | Performed by: PHYSICAL THERAPIST

## 2018-03-09 NOTE — PROGRESS NOTES
PT DAILY TREATMENT NOTE - [facility] 2-15    Patient Name: Katherine Doyle  Date:3/9/2018  : 1985  [x]  Patient  Verified  Payor: BLUE CROSS / Plan: [address] [place] [address] / Product Type: PPO /    In time:1010a  Out time:1120a  Total Treatment Time (min): 60  Total Timed Codes (min): 60  1:1 Treatment Time ( only): --   Visit #: 8     Treatment Area: Right shoulder pain [M25.511]    SUBJECTIVE  Pain Level (0-10 scale): 0/10  Any medication changes, allergies to medications, adverse drug reactions, diagnosis change, or new procedure performed?: [x] No    [] Yes (see summary sheet for update)  Subjective functional status/changes:   [] No changes reported  Pt states that he feels like his mobility is much better and he want to try his self active-mobility stretches on his own.     OBJECTIVE          30 min Therapeutic Exercise:  [x] See flow sheet :   Rationale: increase ROM, increase strength, improve coordination, improve balance and increase proprioception to improve the patients ability to reaching overhead      10 min Therapeutic Activity:  [x] See flow sheet : carrying log overhead   Rationale: increase ROM, increase strength, improve coordination, improve balance and increase proprioception to improve the patients ability to reaching overhead      20 min Manual Therapy: Passive shoulder ER stretching with A-P G-H stabilization, shoulder flexion and IR    Rationale: decrease pain, increase ROM and increase tissue extensibility to improve the patients ability to reaching overhead          With   [x] TE   [] TA   [] neuro   [] other: Patient Education: [x] Review HEP    [] Progressed/Changed HEP based on:   [] positioning   [] body mechanics   [] transfers   [] heat/ice application    [] other:        Other Objective/Functional Measures: --  Right shoulder PROM Flexion 170, , IR 71         Left shoulder PROM ER 95,  IR 84                       Pain Level (0-10 scale) post treatment: 0/10      ASSESSMENT/Changes in Function:   Shoulder total arc of motion is WNL but he does still lack sufficient G-H mobility to attain full shoulder flexion to end range without slight scapular and thoracic compensation. Will follow up in 2 weeks to assess progress and readiness for discharge.  Patient will continue to benefit from skilled PT services to modify and progress therapeutic interventions, address functional mobility deficits, address ROM deficits, address strength deficits, analyze and address soft tissue restrictions, analyze and cue movement patterns, analyze and modify body mechanics/ergonomics and assess and modify postural abnormalities to attain remaining goals.      []  See Plan of Care  []  See progress note/recertification  []  See Discharge Summary      Progress towards goals / Updated goals:  Long Term Goals: To be accomplished in 4-6 weeks:  1) Pt will be independent with HEP Progressing  2) Pt will be able to perform pushups without pain MET  3) Pt will be able to reach overhead without pain MET          PLAN  [x]  Upgrade activities as tolerated     [x]  Continue plan of care  []  Update interventions per flow sheet       []  Discharge due to:_  []  Other:_      Richie Schaumann, PT, DPT 3/9/2018  1:29 PM

## 2018-05-29 ENCOUNTER — OFFICE VISIT (OUTPATIENT)
Dept: INTERNAL MEDICINE CLINIC | Age: 33
End: 2018-05-29

## 2018-05-29 VITALS
WEIGHT: 198 LBS | HEIGHT: 72 IN | SYSTOLIC BLOOD PRESSURE: 119 MMHG | BODY MASS INDEX: 26.82 KG/M2 | OXYGEN SATURATION: 95 % | DIASTOLIC BLOOD PRESSURE: 82 MMHG | TEMPERATURE: 98.7 F | HEART RATE: 71 BPM

## 2018-05-29 DIAGNOSIS — J01.90 ACUTE SINUSITIS, RECURRENCE NOT SPECIFIED, UNSPECIFIED LOCATION: Primary | ICD-10-CM

## 2018-05-29 DIAGNOSIS — R05.9 COUGH: ICD-10-CM

## 2018-05-29 RX ORDER — CODEINE PHOSPHATE AND GUAIFENESIN 10; 100 MG/5ML; MG/5ML
5 SOLUTION ORAL
Qty: 180 ML | Refills: 0 | Status: SHIPPED | OUTPATIENT
Start: 2018-05-29

## 2018-05-29 RX ORDER — AZITHROMYCIN 250 MG/1
TABLET, FILM COATED ORAL
Qty: 6 TAB | Refills: 0 | Status: SHIPPED | OUTPATIENT
Start: 2018-05-29 | End: 2018-06-03

## 2018-05-29 NOTE — PROGRESS NOTES
Chief Complaint   Patient presents with    URI     x 1 week     he is a 33y.o. year old male who presents for evaluation of URI. congestion, sneezing, productive cough and fever for 1 weeks. low grade fevers. no nausea and no vomiting . No dry cough. Over-the-counter remedies including Sudafed    Hx Asthma:  no  Smoker:  no  Contacts with similar infections: no   Recent travel:yes, [place] and [place] and [place] recently. Sputum Description: yellow      Reviewed and agree with Nurse Note and duplicated in this note. Reviewed PmHx, RxHx, FmHx, SocHx, AllgHx and updated and dated in the chart.     Family History   Problem Relation Age of Onset    No Known Problems Mother     No Known Problems Father        Past Medical History:   Diagnosis Date    Nausea & vomiting     Shoulder pain       Social History     Social History    Marital status: SINGLE     Spouse name: N/A    Number of children: N/A    Years of education: N/A     Social History Main Topics    Smoking status: Never Smoker    Smokeless tobacco: Never Used    Alcohol use Yes      Comment: 2-3 drinks a month    Drug use: No    Sexual activity: Not on file     Other Topics Concern    Not on file     Social History Narrative        Review of Systems - negative except as listed above      Objective:     Vitals:    05/29/18 0953   BP: 119/82   Pulse: 71   Temp: 98.7 °F (37.1 °C)   TempSrc: Oral   SpO2: 95%   Weight: 198 lb (89.8 kg)   Height: 6' (1.829 m)       Physical Examination: General appearance - alert, well appearing, and in no distress  Eyes - pupils equal and reactive, extraocular eye movements intact  Ears - bilateral TM's and external ear canals normal  Nose - normal and patent, no erythema, discharge or polyps  Mouth - mucous membranes moist, pharynx normal without lesions  Neck - supple, no significant adenopathy  Chest - clear to auscultation, no wheezes, rales or rhonchi, symmetric air entry  Heart - normal rate, regular rhythm, normal S1, S2, no murmurs, rubs, clicks or gallops  Abdomen - soft, nontender, nondistended, no masses or organomegaly  Neurological - alert, oriented, normal speech, no focal findings or movement disorder noted  Musculoskeletal - no joint tenderness, deformity or swelling  Extremities - peripheral pulses normal, no pedal edema, no clubbing or cyanosis  Skin - normal coloration and turgor, no rashes, no suspicious skin lesions noted    Assessment/ Plan:   Diagnoses and all orders for this visit:    1. Acute sinusitis, recurrence not specified, unspecified location  -     XR CHEST PA LAT; Future    2. Cough  -     XR CHEST PA LAT; Future    Other orders  -     azithromycin (ZITHROMAX) 250 mg tablet; Take 2 tablets today, then take 1 tablet daily     Follow-up Disposition: Not on File  Adults: For nasal congestion, cough and cold/flu symptoms I advised:    - Seek medical care if symptoms become more severe or if you develop      chest pain, shortness of breath, confusion.    - Contact us if your symptoms fail to improve after 7-10 days   - Rest as much as possible and stay home from work/school at least 24 hours                  after last fever              - Wash hand frequently and cough/sneeze into your sleeve to help prevent       infection of others   - Drink plenty of fluids   - Ibuprofen (Advil, Motrin) 400-800mg every 6 hours or                  Aleve 220 mg 1-2 pills every 8 hours for fever, headache, pain   - Tylenol extra strength 500 mg every 6 hours for pain, headache, fever   - Nasal saline rinses 2-3 times daily for nasal congestion   - Mucinex 1200 mg twice daily or Guaifenesin 400 mg every 4 hours for chest       congestion              - Robitussin DM or Delsym for cough(suppress cough and thin mucus.  )   - Cepacol throat lozenges and saline gargles (1 tsp salt in 8 oz water) for sore       throat   - Tea with honey for cough (buckwheat honey preferred)              - Benadryl (diphenhydramine) 50 mg at night for nasal congestion/allergies   - Pseudoephedrine 12-hour tablets twice daily for nasal and inner ear       -Ask your pharmacist (this is kept behind the counter)     -If you have high blood pressure or heart disease, use this        medication with caution (ask your doctor), alternative coricidin   - Afrin (oxymetazoline) nasal spray 2 sprays in each nostril twice daily for severe      congestion.       -Do not use this medication for more than 3 days as it may cause         \"rebound congestion\". -If you have high blood pressure or heart disease, use this medication       with caution (ask your doctor)      1) Remember to stay active and/or exercise regularly (I suggest 30-45 minutes daily)   2) For reliable dietary information, go to www. [employer].org. You may wish to consider seeing the nutritionist at [facility] [phone], also consider the [address] I have discussed the diagnosis with the patient and the intended plan as seen in the above orders. The patient has received an after-visit summary and questions were answered concerning future plans. Medication Side Effects and Warnings were discussed with patient: yes  Patient Labs were reviewed and or requested: yes  Patient Past Records were reviewed and or requested  yes  I have discussed the diagnosis with the patient and the intended plan as seen in the above orders. The patient has received an after-visit summary and questions were answered concerning future plans. Pt agrees to call or return to clinic and/or go to closest ER with any worsening of symptoms. This may include, but not limited to increased fever (>100.4) with NSAIDS or Tylenol, increased edema, confusion, rash, worsening of presenting symptoms.

## 2018-05-29 NOTE — MR AVS SNAPSHOT
[address] [place] [address] [facility] [id number] 
[phone] Patient: Saima Le MRN: [id number] :1985 Visit Information Date & Time Provider Department Dept. Phone Encounter #  
 2018  9:15 AM MD [facility] [phone] [id number] Follow-up Instructions Return if symptoms worsen or fail to improve. Follow-up and Disposition History Upcoming Health Maintenance Date Due DTaP/Tdap/Td series (1 - Tdap) 2007 Influenza Age 5 to Adult 2018 Allergies as of 2018  Review Complete On: 2018 By: Merissa Cleveland No Known Allergies Current Immunizations  Never Reviewed No immunizations on file. Not reviewed this visit You Were Diagnosed With   
  
 Codes Comments Acute sinusitis, recurrence not specified, unspecified location    -  Primary ICD-10-CM: J01.90 ICD-9-CM: 461.9 Cough     ICD-10-CM: R05 ICD-9-CM: 485. 2 Vitals BP Pulse Temp Height(growth percentile) Weight(growth percentile) SpO2  
 119/82 (BP 1 Location: Right arm, BP Patient Position: Sitting) 71 98.7 °F (37.1 °C) (Oral) 6' (1.829 m) 198 lb (89.8 kg) 95% BMI Smoking Status 26.85 kg/m2 Never Smoker Vitals History BMI and BSA Data Body Mass Index Body Surface Area  
 26.85 kg/m 2 2.14 m 2 Preferred Pharmacy Pharmacy Name Phone [address] [phone] Your Updated Medication List  
  
   
This list is accurate as of 18 10:19 AM.  Always use your most recent med list.  
  
  
  
  
 azithromycin 250 mg tablet Commonly known as:  Prerna Shantal Take 2 tablets today, then take 1 tablet daily FISH OIL 1,000 mg Cap Generic drug:  omega-3 fatty acids-vitamin e Take 1 Cap by mouth. guaiFENesin-codeine 100-10 mg/5 mL solution Commonly known as:  ROBITUSSIN AC Take 5 mL by mouth three (3) times daily as needed for Cough. Max Daily Amount: 15 mL. * imiquimod 5 % cream  
Commonly known as:  Roseann Reveal Apply a thin layer 3 times/week prior to bedtime and leave on skin for 6-10 hours. Remove with mild soap and water. Continue treatment until there is total clearance of the warts or a maximum duration of therapy of 16 weeks * imiquimod 5 % cream  
Commonly known as:  Roseann Reveal Apply a thin layer 3 times/week prior to bedtime and leave on skin for 6-10 hours. Continue treatment until there is total clearance  
  
 ketoconazole 2 % topical cream  
Commonly known as:  NIZORAL Apply  to affected area daily. multivitamin tablet Commonly known as:  ONE A DAY Take 1 Tab by mouth daily. nystatin-triamcinolone 100,000-0.1 unit/gram-% ointment Commonly known as:  Diana Calloway Apply to affected area sparingly twice a week for a maximum of 1 week * Notice: This list has 2 medication(s) that are the same as other medications prescribed for you. Read the directions carefully, and ask your doctor or other care provider to review them with you. Prescriptions Printed Refills  
 guaiFENesin-codeine (ROBITUSSIN AC) 100-10 mg/5 mL solution 0 Sig: Take 5 mL by mouth three (3) times daily as needed for Cough. Max Daily Amount: 15 mL. Class: Print Route: Oral  
  
Prescriptions Sent to Pharmacy Refills  
 azithromycin (ZITHROMAX) 250 mg tablet 0 Sig: Take 2 tablets today, then take 1 tablet daily Class: Normal  
 Pharmacy: [facility]  #: [phone] Follow-up Instructions Return if symptoms worsen or fail to improve. To-Do List   
 05/29/2018 Imaging:  XR CHEST PA LAT Patient Instructions Adults: For nasal congestion, cough and cold/flu symptoms I advised: - Seek medical care if symptoms become more severe or if you develop  
   chest pain, shortness of breath, confusion. 
  - Contact us if your symptoms fail to improve after 7-10 days - Rest as much as possible and stay home from work/school at least 24 hours  
               after last fever - Wash hand frequently and cough/sneeze into your sleeve to help prevent  
    infection of others - Drink plenty of fluids - Ibuprofen (Advil, Motrin) 400-800mg every 6 hours or Aleve 220 mg 1-2 pills every 8 hours for fever, headache, pain - Tylenol extra strength 500 mg every 6 hours for pain, headache, fever 
 - Nasal saline rinses 2-3 times daily for nasal congestion - Mucinex 1200 mg twice daily or Guaifenesin 400 mg every 4 hours for chest  
    congestion - Robitussin DM or Delsym for cough(suppress cough and thin mucus. ) 
 - Cepacol throat lozenges and saline gargles (1 tsp salt in 8 oz water) for sore  
    throat - Tea with honey for cough (buckwheat honey preferred) - Benadryl (diphenhydramine) 50 mg at night for nasal congestion/allergies - Pseudoephedrine 12-hour tablets twice daily for nasal and inner ear    
  -Ask your pharmacist (this is kept behind the counter) -If you have high blood pressure or heart disease, use this    
   medication with caution (ask your doctor), alternative coricidin - Afrin (oxymetazoline) nasal spray 2 sprays in each nostril twice daily for severe  
   congestion.   
   -Do not use this medication for more than 3 days as it may cause \"rebound congestion\". -If you have high blood pressure or heart disease, use this medication  
    with caution (ask your doctor) Introducing \[facility] [place] [facility]\"" & HEALTH SERVICES!    
 [employer] introduces [employer] patient portal. Now you can access parts of your medical record, email your doctor's office, and request medication refills online. 1. In your internet browser, go to [email or web address] 2. Click on the First Time User? Click Here link in the Sign In box. You will see the New Member Sign Up page. 3. Enter your [employer] Access Code exactly as it appears below. You will not need to use this code after youve completed the sign-up process. If you do not sign up before the expiration date, you must request a new code. · [employer] Access Code: [email or web address] Expires: 8/27/2018 10:19 AM 
 
4. Enter the last four digits of your Social Security Number (xxxx) and Date of Birth (mm/dd/yyyy) as indicated and click Submit. You will be taken to the next sign-up page. 5. Create a [employer] ID. This will be your [employer] login ID and cannot be changed, so think of one that is secure and easy to remember. 6. Create a [employer] password. You can change your password at any time. 7. Enter your Password Reset Question and Answer. This can be used at a later time if you forget your password. 8. Enter your e-mail address. You will receive e-mail notification when new information is available in [address] 9. Click Sign Up. You can now view and download portions of your medical record. 10. Click the Download Summary menu link to download a portable copy of your medical information. If you have questions, please visit the Frequently Asked Questions section of the [employer] website. Remember, [employer] is NOT to be used for urgent needs. For medical emergencies, dial 911. Now available from your iPhone and Android! Please provide this summary of care documentation to your next provider. Your primary care clinician is listed as Manuel [email or web address]. If you have any questions after today's visit, please call [phone].

## 2018-09-14 NOTE — ANCILLARY DISCHARGE INSTRUCTIONS
[facility], [address] Phone: [phone]  Fax: [phone] Discharge Summary  2-15 Patient name: Gus Noriega  : 1985  Provider#: [id number] Referral source: Adriano Freedman MD     
Medical/Treatment Diagnosis: Right shoulder pain [M25.511] Prior Hospitalization: see medical history Comorbidities: Right shoulder surgery Prior Level of Function: complete 20 minutes of exercise at least 3 times a week Medications: Verified on Patient Summary List 
 
Start of Care: 18      Onset Date:18 Visits from Start of Care: 8     Missed Visits: 0 Reporting Period : 18 to 3/9/18 Long Term Goals: To be accomplished in 4-6 weeks: 
1) Pt will be independent with HEP Progressing 2) Pt will be able to perform pushups without pain MET 3) Pt will be able to reach overhead without pain MET 
 
 
ASSESSMENT/SUMMARY OF CARE: Pt was seen for 8 skilled PT visits secondary to R shoulder pain. He was progressing well with his program and reported much improved glenohumeral mobility with less discomfort, however, Pt did not show for his last appointment. Thus, final objective data and outcomes were unable to be obtained. RECOMMENDATIONS: 
[x]Discontinue therapy: []Patient has reached or is progressing toward set goals [x]Patient is non-compliant or has abdicated 
    []Due to lack of appreciable progress towards set goals Tabatha Alvarado 2018 9:41 AM

## 2019-10-13 NOTE — PROGRESS NOTES
PT DAILY TREATMENT NOTE - [facility]-15    Patient Name: Reji Beaver  Date:2017  : 1985  [x]  Patient  Verified  Payor: Marv Preston / Plan: [address] [place] [address] / Product Type: PPO /    In time: 9:30am  Out time: 10:35am  Total Treatment Time (min): 65  Total Timed Codes (min): 55  1:1 Treatment Time ( only): --   Visit #: 9    Treatment Area: Pain in left shoulder [M25.512]    SUBJECTIVE  Pain Level (0-10 scale): 0/10  Any medication changes, allergies to medications, adverse drug reactions, diagnosis change, or new procedure performed?: [x] No    [] Yes (see summary sheet for update)  Subjective functional status/changes:   [] No changes reported  Pt had follow-up visit with Dr. Gali Lopez last week; pleased with progress. Pt will return to MD in about 1 month.     OBJECTIVE  Modality rationale: decrease inflammation and decrease pain to improve functional use of right UE   Min Type Additional Details    [] Estim: []Att   []Unatt        []TENS instruct                  []IFC  []Premod   []NMES                     []Other:  []w/US   []w/ice   []w/heat  Position:  Location:    []  Traction: [] Cervical       []Lumbar                       [] Prone          []Supine                       []Intermittent   []Continuous Lbs:  [] before manual  [] after manual  []w/heat    []  Ultrasound: []Continuous   [] Pulsed at:                           []1MHz   []3MHz Location:  W/cm2:    [] Paraffin         Location:   []w/heat   10 [x]  Ice     []  Heat  []  Ice massage Position: seated  Location: right shoulder at end of session    []  Laser  []  Other: Position:  Location:      []  Vasopneumatic Device Pressure:       [] lo [] med [] hi   Right shoulder at end of session   [x] Skin assessment post-treatment:  [x]intact []redness- no adverse reaction    []redness  adverse reaction:     45 min Therapeutic Exercise:  [x] See flow sheet :   Rationale: increase ROM, increase strength and improve coordination to improve the patients ability to perform functional activities    10 min Manual Therapy: TPR right rhomboids; PROM right shoulder flexion, ER, IR, abduction and scaption to pt's tolerance; PNF D2 flexion/extension with light manual resistance   Rationale: decrease pain, increase ROM, increase tissue extensibility and increase postural awareness to improve the patients ability to use right UE for functional activities          With   [] TE   [] TA   [] neuro   [] other: Patient Education: [x] Review HEP    [] Progressed/Changed HEP based on:   [] positioning   [] body mechanics   [] transfers   [] heat/ice application    [] other:      Other Objective/Functional Measures: --     Pain Level (0-10 scale) post treatment: 0/10    ASSESSMENT/Changes in Function:   Pt fatigued quickly with scapular stabilization exercises. Pt educated about caution with return to more vigorous activity until next follow with Dr. Romulo Lion, in August, 2017; pt verbalized understanding. Patient will continue to benefit from skilled PT services to modify and progress therapeutic interventions, address functional mobility deficits, address ROM deficits, address strength deficits, analyze and address soft tissue restrictions, analyze and cue movement patterns, analyze and modify body mechanics/ergonomics and assess and modify postural abnormalities to attain remaining goals. []  See Plan of Care  []  See progress note/recertification  []  See Discharge Summary         Progress towards goals / Updated goals:  Short Term Goals: To be accomplished in 6 weeks:  1) Pt will be independent with HEP. MET  2) Pt will be able to perform grooming ADL's without pain. MET  3) Pt will be able to put on a shirt without pain. MET  4) Pt will be able to lie in bed to fall asleep without increase of pain. MET      Long Term Goals: To be accomplished in 12 weeks:  1) Pt will be able to reach above shoulder level without increase of pain.  progressing  2) Pt will be able to sleep through the night without waking in pain. MET  3) Pt will be able to tuck in shirt without pain. progressing  4) Pt will be educated on gradual return to lifting weights to further restore right shoulder strength and power. progressing  5) Pt will report improvement in overall functional mobility, as measured by FOTO, with an increased score of at least 37 points, from 31 to 68.  NOT ASSESSED     PLAN  [x]  Upgrade activities as tolerated     [x]  Continue plan of care  []  Update interventions per flow sheet       []  Discharge due to:_  []  Other:_      Jac Alfaro PT, DPT    7/24/2017  10:57 AM today

## (undated) DEVICE — DEVON™ KNEE AND BODY STRAP 60" X 3" (1.5 M X 7.6 CM): Brand: DEVON

## (undated) DEVICE — 4.5 MM HELICUT STRAIGHT BURRS,                                    POWER/EP-1, SLATE, 5000 MAXIMUM RPM,                                    PACKAGED 6 PER BOX, STERILE: Brand: DYONICS HELICUT

## (undated) DEVICE — STERILE POLYISOPRENE POWDER-FREE SURGICAL GLOVES: Brand: PROTEXIS

## (undated) DEVICE — LIGHT HANDLE: Brand: DEVON

## (undated) DEVICE — GOWN,SIRUS,NONRNF,SETINSLV,2XL,18/CS: Brand: MEDLINE

## (undated) DEVICE — DUAL IRRIGATION ADAPTOR

## (undated) DEVICE — SUTURE FIBERWIRE SZ 2 L38IN NONABSORBABLE BLU WHT BLK AR7201

## (undated) DEVICE — (D)SYR 10ML 1/5ML GRAD NSAF -- PKGING CHANGE USE ITEM 338027

## (undated) DEVICE — DRAPE,U/ SHT,SPLIT,PLAS,STERIL: Brand: MEDLINE

## (undated) DEVICE — Z CONVERTED USE 2271148 CONNECTOR TBNG POLYPR 5IN1 TOUGH SHATTERPROOF FOR 5-11MM TB

## (undated) DEVICE — 3M™ STERI-DRAPE™ U-DRAPE 1015: Brand: STERI-DRAPE™

## (undated) DEVICE — SUTURE FIBERLINK SZ 2-0 L26IN NONABSORBABLE BLU CLS LOOP AR7235

## (undated) DEVICE — TUBING PMP IRRIG GOFLO

## (undated) DEVICE — GAUZE SPONGES,12 PLY: Brand: CURITY

## (undated) DEVICE — (D)PREP SKN CHLRAPRP APPL 26ML -- CONVERT TO ITEM 371833

## (undated) DEVICE — SOLUTION IRRIG 3000ML LAC R FLX CONT

## (undated) DEVICE — 4.5 MM INCISOR PLUS STRAIGHT                                    BLADES, POWER/EP-1, VIOLET, PACKAGED                                    6 PER BOX, STERILE

## (undated) DEVICE — SUT ETHLN 3-0 18IN PS2 BLK --

## (undated) DEVICE — SUPER TURBOVAC 90 INTEGRATED CABLE WAND ICW: Brand: COBLATION

## (undated) DEVICE — CLEAR-TRAC THREADED CANNULA WITH                                    OBTURATOR 5 MM X 76 MM, LATEX FREE,                                    BOX OF 10: Brand: CLEAR-TRAC

## (undated) DEVICE — ABDOMINAL PAD: Brand: DERMACEA

## (undated) DEVICE — INFECTION CONTROL KIT SYS

## (undated) DEVICE — ARTHROSCOPY RICHMOND-LF: Brand: MEDLINE INDUSTRIES, INC.

## (undated) DEVICE — MAT SUCT W36XL48IN FLD CTRL DISP

## (undated) DEVICE — STERILE POLYISOPRENE POWDER-FREE SURGICAL GLOVES WITH EMOLLIENT COATING: Brand: PROTEXIS

## (undated) DEVICE — NEEDLE SUT PASS FOR ROT CUF LABRAL REP MULTFI SCORPION

## (undated) DEVICE — TIBURON SPLIT SHEET: Brand: CONVERTORS

## (undated) DEVICE — CURITY NON-ADHERENT STRIPS: Brand: CURITY

## (undated) DEVICE — SUTURE PDS II SZ 0 L27IN ABSRB VLT L36MM CT-1 1/2 CIR Z340H

## (undated) DEVICE — MEDI-VAC NON-CONDUCTIVE SUCTION TUBING: Brand: CARDINAL HEALTH

## (undated) DEVICE — CANNULA ARTHSCP L7CM DIA7MM TRNSLUC THRD FLX W/ NO SQUIRT

## (undated) DEVICE — 4-PORT MANIFOLD: Brand: NEPTUNE 2

## (undated) DEVICE — SHOULDER SUSPENSION KIT 6 PER BOX